# Patient Record
Sex: FEMALE | Race: BLACK OR AFRICAN AMERICAN | URBAN - METROPOLITAN AREA
[De-identification: names, ages, dates, MRNs, and addresses within clinical notes are randomized per-mention and may not be internally consistent; named-entity substitution may affect disease eponyms.]

---

## 2017-12-10 PROBLEM — Z00.00 ENCOUNTER FOR PREVENTIVE HEALTH EXAMINATION: Status: ACTIVE | Noted: 2017-12-10

## 2017-12-22 ENCOUNTER — EMERGENCY (EMERGENCY)
Facility: HOSPITAL | Age: 57
LOS: 0 days | Discharge: HOME | End: 2017-12-22

## 2017-12-22 DIAGNOSIS — B02.9 ZOSTER WITHOUT COMPLICATIONS: ICD-10-CM

## 2017-12-22 DIAGNOSIS — R21 RASH AND OTHER NONSPECIFIC SKIN ERUPTION: ICD-10-CM

## 2017-12-22 DIAGNOSIS — J18.9 PNEUMONIA, UNSPECIFIED ORGANISM: ICD-10-CM

## 2017-12-31 ENCOUNTER — INPATIENT (INPATIENT)
Facility: HOSPITAL | Age: 57
LOS: 3 days | Discharge: HOME | End: 2018-01-04
Attending: INTERNAL MEDICINE

## 2017-12-31 DIAGNOSIS — J18.9 PNEUMONIA, UNSPECIFIED ORGANISM: ICD-10-CM

## 2018-01-08 DIAGNOSIS — A41.9 SEPSIS, UNSPECIFIED ORGANISM: ICD-10-CM

## 2018-01-08 DIAGNOSIS — I10 ESSENTIAL (PRIMARY) HYPERTENSION: ICD-10-CM

## 2018-01-08 DIAGNOSIS — E86.0 DEHYDRATION: ICD-10-CM

## 2018-01-08 DIAGNOSIS — J18.9 PNEUMONIA, UNSPECIFIED ORGANISM: ICD-10-CM

## 2018-01-08 DIAGNOSIS — D61.818 OTHER PANCYTOPENIA: ICD-10-CM

## 2018-01-08 DIAGNOSIS — B02.29 OTHER POSTHERPETIC NERVOUS SYSTEM INVOLVEMENT: ICD-10-CM

## 2018-01-08 DIAGNOSIS — R63.8 OTHER SYMPTOMS AND SIGNS CONCERNING FOOD AND FLUID INTAKE: ICD-10-CM

## 2018-01-08 DIAGNOSIS — D69.6 THROMBOCYTOPENIA, UNSPECIFIED: ICD-10-CM

## 2018-01-08 DIAGNOSIS — E87.1 HYPO-OSMOLALITY AND HYPONATREMIA: ICD-10-CM

## 2018-01-08 DIAGNOSIS — R09.02 HYPOXEMIA: ICD-10-CM

## 2018-01-08 DIAGNOSIS — N17.9 ACUTE KIDNEY FAILURE, UNSPECIFIED: ICD-10-CM

## 2018-01-09 ENCOUNTER — APPOINTMENT (OUTPATIENT)
Dept: OBGYN | Facility: CLINIC | Age: 58
End: 2018-01-09

## 2018-01-13 ENCOUNTER — INPATIENT (INPATIENT)
Facility: HOSPITAL | Age: 58
LOS: 5 days | Discharge: HOME | End: 2018-01-19
Attending: INTERNAL MEDICINE

## 2018-01-13 DIAGNOSIS — J18.9 PNEUMONIA, UNSPECIFIED ORGANISM: ICD-10-CM

## 2018-01-13 DIAGNOSIS — E87.1 HYPO-OSMOLALITY AND HYPONATREMIA: ICD-10-CM

## 2018-01-24 DIAGNOSIS — A41.9 SEPSIS, UNSPECIFIED ORGANISM: ICD-10-CM

## 2018-01-24 DIAGNOSIS — J91.8 PLEURAL EFFUSION IN OTHER CONDITIONS CLASSIFIED ELSEWHERE: ICD-10-CM

## 2018-01-24 DIAGNOSIS — J18.9 PNEUMONIA, UNSPECIFIED ORGANISM: ICD-10-CM

## 2018-01-24 DIAGNOSIS — R50.9 FEVER, UNSPECIFIED: ICD-10-CM

## 2018-01-24 DIAGNOSIS — J10.1 INFLUENZA DUE TO OTHER IDENTIFIED INFLUENZA VIRUS WITH OTHER RESPIRATORY MANIFESTATIONS: ICD-10-CM

## 2018-01-24 DIAGNOSIS — D50.9 IRON DEFICIENCY ANEMIA, UNSPECIFIED: ICD-10-CM

## 2018-05-17 ENCOUNTER — APPOINTMENT (OUTPATIENT)
Dept: OBGYN | Facility: CLINIC | Age: 58
End: 2018-05-17
Payer: MEDICAID

## 2018-05-17 PROCEDURE — 99396 PREV VISIT EST AGE 40-64: CPT

## 2018-12-11 ENCOUNTER — APPOINTMENT (OUTPATIENT)
Dept: OBGYN | Facility: CLINIC | Age: 58
End: 2018-12-11
Payer: MEDICAID

## 2018-12-11 PROCEDURE — 99214 OFFICE O/P EST MOD 30 MIN: CPT

## 2018-12-17 ENCOUNTER — APPOINTMENT (OUTPATIENT)
Dept: OBGYN | Facility: CLINIC | Age: 58
End: 2018-12-17
Payer: MEDICAID

## 2018-12-17 PROCEDURE — 76830 TRANSVAGINAL US NON-OB: CPT

## 2018-12-20 ENCOUNTER — APPOINTMENT (OUTPATIENT)
Dept: OBGYN | Facility: CLINIC | Age: 58
End: 2018-12-20
Payer: MEDICAID

## 2018-12-20 PROCEDURE — 57454 BX/CURETT OF CERVIX W/SCOPE: CPT

## 2019-01-15 ENCOUNTER — APPOINTMENT (OUTPATIENT)
Dept: OBGYN | Facility: CLINIC | Age: 59
End: 2019-01-15
Payer: MEDICAID

## 2019-01-15 PROCEDURE — 58558Z: CUSTOM

## 2019-01-29 ENCOUNTER — APPOINTMENT (OUTPATIENT)
Dept: OBGYN | Facility: CLINIC | Age: 59
End: 2019-01-29

## 2019-04-09 ENCOUNTER — APPOINTMENT (OUTPATIENT)
Dept: OBGYN | Facility: CLINIC | Age: 59
End: 2019-04-09

## 2019-06-07 ENCOUNTER — APPOINTMENT (OUTPATIENT)
Dept: PLASTIC SURGERY | Facility: CLINIC | Age: 59
End: 2019-06-07
Payer: COMMERCIAL

## 2019-06-07 VITALS — BODY MASS INDEX: 28.04 KG/M2 | WEIGHT: 185 LBS | HEIGHT: 68 IN

## 2019-06-07 DIAGNOSIS — Z78.9 OTHER SPECIFIED HEALTH STATUS: ICD-10-CM

## 2019-06-07 PROCEDURE — 99202 OFFICE O/P NEW SF 15 MIN: CPT | Mod: 25

## 2019-06-07 PROCEDURE — 20550 NJX 1 TENDON SHEATH/LIGAMENT: CPT

## 2019-06-07 RX ORDER — NABUMETONE 500 MG/1
500 TABLET, FILM COATED ORAL
Refills: 0 | Status: ACTIVE | COMMUNITY

## 2019-06-07 NOTE — PHYSICAL EXAM
[de-identified] : well-appearining, NAD [de-identified] : right UE: normal tenodesis and finger cascade, guarding of radial wrist and thumb, intact FDS/FDP/extensors, tenderness over CMC, +grind test, + tenderness of 1st extensor compartment +finkelstein test; triggering of thumb with palpable volar nodule at A1 pulley\par Left UE: normal tenodesis and finger cascade, guarding of radial wrist and thumb, intact FDS/FDP/extensors, negative grind test, no palmar nodules

## 2019-06-07 NOTE — HISTORY OF PRESENT ILLNESS
[FreeTextEntry1] : 57 yo RHD F with 1 year history of right wrist pain of thumb.  She reports that the right thumb cannot be extended or flexed due to pain over the back of thumb/wrist and the palmar side of thumb.  She reports 2 month history of thumb spica splint use with some relief in order to work.  But still has persistent right wrist pain, which has been worsening over 1 year.   Pt was started on nabumetone for wrist pain with some relief.  She reports no prior surgical treatments for current complaint or prior UE surgeries.  She was referred by her Rheumatologist- Dr. Colindres for treatment of suspected DeQuervain's syndrome.  \par \par Denies numbness, redness or swelling in hands/fingers.

## 2019-06-07 NOTE — PROCEDURE
[Nl] : None [FreeTextEntry1] : right wrist DeQervain's and trigger thumb [FreeTextEntry6] : The benefits, risks, and outcomes of kenalog injection were discussed. The risks include infection, hypopigmentation, poor wound healing, fat atrophy, and dissatisfaction with outcome. The patient understood the risks and elected to proceed with steroid injection.\par \par Informed consent was obtained\par \par The right 1st extensor compartement and thumb at A1 pulley finger was prepared in sterile fashion. \par The injection site was identified and marked. \par Triamcinolone 1 ml was injected into the extensor tendon sheath without issue. \par Triamcinolone 1 ml was injected into the flexor tendon sheath without issue. \par The patient tolerated the procedure. [FreeTextEntry2] : right wrist DeQervain's and trigger thumb kenalog injection

## 2019-06-13 ENCOUNTER — APPOINTMENT (OUTPATIENT)
Dept: OBGYN | Facility: CLINIC | Age: 59
End: 2019-06-13
Payer: COMMERCIAL

## 2019-06-13 PROCEDURE — 99213 OFFICE O/P EST LOW 20 MIN: CPT

## 2019-06-13 PROCEDURE — 87490 CHLMYD TRACH DNA DIR PROBE: CPT

## 2019-06-23 ENCOUNTER — EMERGENCY (EMERGENCY)
Facility: HOSPITAL | Age: 59
LOS: 0 days | Discharge: HOME | End: 2019-06-24
Attending: EMERGENCY MEDICINE | Admitting: EMERGENCY MEDICINE
Payer: COMMERCIAL

## 2019-06-23 VITALS
WEIGHT: 184.97 LBS | SYSTOLIC BLOOD PRESSURE: 119 MMHG | RESPIRATION RATE: 18 BRPM | TEMPERATURE: 100 F | HEART RATE: 120 BPM | OXYGEN SATURATION: 97 % | DIASTOLIC BLOOD PRESSURE: 58 MMHG

## 2019-06-23 DIAGNOSIS — R53.81 OTHER MALAISE: ICD-10-CM

## 2019-06-23 DIAGNOSIS — R50.9 FEVER, UNSPECIFIED: ICD-10-CM

## 2019-06-23 DIAGNOSIS — Z79.51 LONG TERM (CURRENT) USE OF INHALED STEROIDS: ICD-10-CM

## 2019-06-23 DIAGNOSIS — R05 COUGH: ICD-10-CM

## 2019-06-23 DIAGNOSIS — Z79.52 LONG TERM (CURRENT) USE OF SYSTEMIC STEROIDS: ICD-10-CM

## 2019-06-23 LAB
ALBUMIN SERPL ELPH-MCNC: 3.1 G/DL — LOW (ref 3.5–5.2)
ALP SERPL-CCNC: 44 U/L — SIGNIFICANT CHANGE UP (ref 30–115)
ALT FLD-CCNC: 16 U/L — SIGNIFICANT CHANGE UP (ref 0–41)
ANION GAP SERPL CALC-SCNC: 10 MMOL/L — SIGNIFICANT CHANGE UP (ref 7–14)
AST SERPL-CCNC: 22 U/L — SIGNIFICANT CHANGE UP (ref 0–41)
BILIRUB SERPL-MCNC: 0.2 MG/DL — SIGNIFICANT CHANGE UP (ref 0.2–1.2)
BUN SERPL-MCNC: 20 MG/DL — SIGNIFICANT CHANGE UP (ref 10–20)
CALCIUM SERPL-MCNC: 8.3 MG/DL — LOW (ref 8.5–10.1)
CHLORIDE SERPL-SCNC: 101 MMOL/L — SIGNIFICANT CHANGE UP (ref 98–110)
CO2 SERPL-SCNC: 23 MMOL/L — SIGNIFICANT CHANGE UP (ref 17–32)
CREAT SERPL-MCNC: 1.1 MG/DL — SIGNIFICANT CHANGE UP (ref 0.7–1.5)
GLUCOSE SERPL-MCNC: 100 MG/DL — HIGH (ref 70–99)
HCT VFR BLD CALC: 36.9 % — LOW (ref 37–47)
HGB BLD-MCNC: 11.5 G/DL — LOW (ref 12–16)
LACTATE SERPL-SCNC: 0.8 MMOL/L — SIGNIFICANT CHANGE UP (ref 0.5–2.2)
MAGNESIUM SERPL-MCNC: 2.1 MG/DL — SIGNIFICANT CHANGE UP (ref 1.8–2.4)
MCHC RBC-ENTMCNC: 26.6 PG — LOW (ref 27–31)
MCHC RBC-ENTMCNC: 31.2 G/DL — LOW (ref 32–37)
MCV RBC AUTO: 85.2 FL — SIGNIFICANT CHANGE UP (ref 81–99)
NRBC # BLD: 0 /100 WBCS — SIGNIFICANT CHANGE UP (ref 0–0)
PLATELET # BLD AUTO: 142 K/UL — SIGNIFICANT CHANGE UP (ref 130–400)
POTASSIUM SERPL-MCNC: 4.3 MMOL/L — SIGNIFICANT CHANGE UP (ref 3.5–5)
POTASSIUM SERPL-SCNC: 4.3 MMOL/L — SIGNIFICANT CHANGE UP (ref 3.5–5)
PROT SERPL-MCNC: 8 G/DL — SIGNIFICANT CHANGE UP (ref 6–8)
RBC # BLD: 4.33 M/UL — SIGNIFICANT CHANGE UP (ref 4.2–5.4)
RBC # FLD: 14.1 % — SIGNIFICANT CHANGE UP (ref 11.5–14.5)
SODIUM SERPL-SCNC: 134 MMOL/L — LOW (ref 135–146)
WBC # BLD: 3.81 K/UL — LOW (ref 4.8–10.8)
WBC # FLD AUTO: 3.81 K/UL — LOW (ref 4.8–10.8)

## 2019-06-23 PROCEDURE — 99284 EMERGENCY DEPT VISIT MOD MDM: CPT

## 2019-06-23 PROCEDURE — 71046 X-RAY EXAM CHEST 2 VIEWS: CPT | Mod: 26

## 2019-06-23 RX ORDER — SODIUM CHLORIDE 9 MG/ML
2000 INJECTION, SOLUTION INTRAVENOUS ONCE
Refills: 0 | Status: COMPLETED | OUTPATIENT
Start: 2019-06-23 | End: 2019-06-23

## 2019-06-23 RX ORDER — ACETAMINOPHEN 500 MG
650 TABLET ORAL ONCE
Refills: 0 | Status: COMPLETED | OUTPATIENT
Start: 2019-06-23 | End: 2019-06-23

## 2019-06-23 RX ORDER — ALBUTEROL 90 UG/1
2.5 AEROSOL, METERED ORAL ONCE
Refills: 0 | Status: COMPLETED | OUTPATIENT
Start: 2019-06-23 | End: 2019-06-23

## 2019-06-23 RX ADMIN — Medication 650 MILLIGRAM(S): at 23:11

## 2019-06-23 RX ADMIN — SODIUM CHLORIDE 2000 MILLILITER(S): 9 INJECTION, SOLUTION INTRAVENOUS at 23:11

## 2019-06-23 NOTE — ED ADULT NURSE NOTE - CHPI ED NUR SYMPTOMS NEG
no chills/no tingling/no pain/no weakness/no vomiting/no fever/no nausea/no dizziness/no decreased eating/drinking

## 2019-06-23 NOTE — ED ADULT NURSE NOTE - OBJECTIVE STATEMENT
patient complaints of productive cough and congestion., Patient denies n/v, chest pain and no SOB noted.

## 2019-06-24 VITALS
HEART RATE: 88 BPM | OXYGEN SATURATION: 98 % | RESPIRATION RATE: 18 BRPM | SYSTOLIC BLOOD PRESSURE: 110 MMHG | DIASTOLIC BLOOD PRESSURE: 70 MMHG | TEMPERATURE: 98 F

## 2019-06-24 RX ORDER — ALBUTEROL 90 UG/1
2 AEROSOL, METERED ORAL
Qty: 1 | Refills: 0
Start: 2019-06-24

## 2019-06-24 RX ADMIN — ALBUTEROL 2.5 MILLIGRAM(S): 90 AEROSOL, METERED ORAL at 00:05

## 2019-06-24 NOTE — ED PROVIDER NOTE - NSFOLLOWUPINSTRUCTIONS_ED_ALL_ED_FT
Return to the ER for worsening or concerning symptoms.    See printed discharge information sheets for further instructions on care for your condition.     Cough, Adult  Coughing is a reflex that clears your throat and your airways. Coughing helps to heal and protect your lungs. It is normal to cough occasionally, but a cough that happens with other symptoms or lasts a long time may be a sign of a condition that needs treatment. A cough may last only 2–3 weeks (acute), or it may last longer than 8 weeks (chronic).    What are the causes?  Coughing is commonly caused by:    Breathing in substances that irritate your lungs.  A viral or bacterial respiratory infection.  Allergies.  Asthma.  Postnasal drip.  Smoking.  Acid backing up from the stomach into the esophagus (gastroesophageal reflux).  Certain medicines.  Chronic lung problems, including COPD (or rarely, lung cancer).  Other medical conditions such as heart failure.    Follow these instructions at home:  Pay attention to any changes in your symptoms. Take these actions to help with your discomfort:    Take medicines only as told by your health care provider.    If you were prescribed an antibiotic medicine, take it as told by your health care provider. Do not stop taking the antibiotic even if you start to feel better.  Talk with your health care provider before you take a cough suppressant medicine.    Drink enough fluid to keep your urine clear or pale yellow.  If the air is dry, use a cold steam vaporizer or humidifier in your bedroom or your home to help loosen secretions.  Avoid anything that causes you to cough at work or at home.  If your cough is worse at night, try sleeping in a semi-upright position.  Avoid cigarette smoke. If you smoke, quit smoking. If you need help quitting, ask your health care provider.  Avoid caffeine.  Avoid alcohol.  Rest as needed.    Contact a health care provider if:  You have new symptoms.  You cough up pus.  Your cough does not get better after 2–3 weeks, or your cough gets worse.  You cannot control your cough with suppressant medicines and you are losing sleep.  You develop pain that is getting worse or pain that is not controlled with pain medicines.  You have a fever.  You have unexplained weight loss.  You have night sweats.  Get help right away if:  You cough up blood.  You have difficulty breathing.  Your heartbeat is very fast.  This information is not intended to replace advice given to you by your health care provider. Make sure you discuss any questions you have with your health care provider.    Viral Illness, Adult  Viruses are tiny germs that can get into a person's body and cause illness. There are many different types of viruses, and they cause many types of illness. Viral illnesses can range from mild to severe. They can affect various parts of the body.    Common illnesses that are caused by a virus include colds and the flu. Viral illnesses also include serious conditions such as HIV/AIDS (human immunodeficiency virus/acquired immunodeficiency syndrome). A few viruses have been linked to certain cancers.    What are the causes?  Many types of viruses can cause illness. Viruses invade cells in your body, multiply, and cause the infected cells to malfunction or die. When the cell dies, it releases more of the virus. When this happens, you develop symptoms of the illness, and the virus continues to spread to other cells. If the virus takes over the function of the cell, it can cause the cell to divide and grow out of control, as is the case when a virus causes cancer.    Different viruses get into the body in different ways. You can get a virus by:    Swallowing food or water that is contaminated with the virus.  Breathing in droplets that have been coughed or sneezed into the air by an infected person.  Touching a surface that has been contaminated with the virus and then touching your eyes, nose, or mouth.  Being bitten by an insect or animal that carries the virus.  Having sexual contact with a person who is infected with the virus.  Being exposed to blood or fluids that contain the virus, either through an open cut or during a transfusion.    If a virus enters your body, your body's defense system (immune system) will try to fight the virus. You may be at higher risk for a viral illness if your immune system is weak.    What are the signs or symptoms?  Symptoms vary depending on the type of virus and the location of the cells that it invades. Common symptoms of the main types of viral illnesses include:    Cold and flu viruses     Fever.  Headache.  Sore throat.  Muscle aches.  Nasal congestion.  Cough.  Digestive system (gastrointestinal) viruses     Fever.  Abdominal pain.  Nausea.  Diarrhea.  Liver viruses (hepatitis)     Loss of appetite.  Tiredness.  Yellowing of the skin (jaundice).  Brain and spinal cord viruses     Fever.  Headache.  Stiff neck.  Nausea and vomiting.  Confusion or sleepiness.  Skin viruses     Warts.  Itching.  Rash.  Sexually transmitted viruses     Discharge.  Swelling.  Redness.  Rash.  How is this treated?  Viruses can be difficult to treat because they live within cells. Antibiotic medicines do not treat viruses because these drugs do not get inside cells. Treatment for a viral illness may include:    Resting and drinking plenty of fluids.  Medicines to relieve symptoms. These can include over-the-counter medicine for pain and fever, medicines for cough or congestion, and medicines to relieve diarrhea.  Antiviral medicines. These drugs are available only for certain types of viruses. They may help reduce flu symptoms if taken early. There are also many antiviral medicines for hepatitis and HIV/AIDS.    Some viral illnesses can be prevented with vaccinations. A common example is the flu shot.    Follow these instructions at home:  Medicines     Image   Take over-the-counter and prescription medicines only as told by your health care provider.  If you were prescribed an antiviral medicine, take it as told by your health care provider. Do not stop taking the medicine even if you start to feel better.  Be aware of when antibiotics are needed and when they are not needed. Antibiotics do not treat viruses. If your health care provider thinks that you may have a bacterial infection as well as a viral infection, you may get an antibiotic.    Do not ask for an antibiotic prescription if you have been diagnosed with a viral illness. That will not make your illness go away faster.  Frequently taking antibiotics when they are not needed can lead to antibiotic resistance. When this develops, the medicine no longer works against the bacteria that it normally fights.    General instructions     Drink enough fluids to keep your urine clear or pale yellow.  Rest as much as possible.  Return to your normal activities as told by your health care provider. Ask your health care provider what activities are safe for you.  Keep all follow-up visits as told by your health care provider. This is important.  How is this prevented?  ImageTake these actions to reduce your risk of viral infection:    Eat a healthy diet and get enough rest.  Wash your hands often with soap and water. This is especially important when you are in public places. If soap and water are not available, use hand .  Avoid close contact with friends and family who have a viral illness.  If you travel to areas where viral gastrointestinal infection is common, avoid drinking water or eating raw food.  Keep your immunizations up to date. Get a flu shot every year as told by your health care provider.  Do not share toothbrushes, nail clippers, razors, or needles with other people.  Always practice safe sex.    Contact a health care provider if:  You have symptoms of a viral illness that do not go away.  Your symptoms come back after going away.  Your symptoms get worse.  Get help right away if:  You have trouble breathing.  You have a severe headache or a stiff neck.  You have severe vomiting or abdominal pain.  This information is not intended to replace advice given to you by your health care provider. Make sure you discuss any questions you have with your health care provider.

## 2019-06-24 NOTE — ED PROVIDER NOTE - CLINICAL SUMMARY MEDICAL DECISION MAKING FREE TEXT BOX
58f w fever at home and cough, no resp distress. Labs & imaging reviewed. Nebs given with improvement in symptoms. Pt advised regarding symptomatic/supportive care, importance of PMD f/u, and symptoms to prompt ED return. Copy of results given to patient.

## 2019-06-24 NOTE — ED PROVIDER NOTE - PHYSICAL EXAMINATION
Physical Exam  General: Awake, alert, NAD, WDWN, non-toxic appearing, NCAT  Eyes: PERRL, EOMI, no icterus, lids and conjunctivae are normal  ENT: External inspection normal, pink/dry membranes, pharynx normal, no pharyngeal erythema/exudate  CV: S1S2, regular rate and rhythm, no murmur/gallops/rubs, no JVD, 2+ pulses b/l, no edema/cords/homans, warm/well-perfused  Respiratory: Normal respiratory rate/effort, no respiratory distress, normal voice, speaking full sentences, lungs clear to auscultation b/l, no wheezing/rales/rhonchi, no retractions, no stridor  Abdomen: Soft abdomen, no tender/distended/guarding/rebound, no CVA tender  Musculoskeletal: FROM all 4 extremities, N/V intact  Neck: FROM neck, supple, no meningismus, trachea midline, no JVD  Integumentary: Color normal for race, warm and dry, no rash  Neuro: Oriented x3, CN 2-12 grossly intact, normal motor, normal sensory  Psych: Oriented x3, mood normal, affect normal

## 2019-06-24 NOTE — ED PROVIDER NOTE - NSFOLLOWUPCLINICS_GEN_ALL_ED_FT
A Family Medicine Doctor  Family Medicine  .  [place]   Phone:   Fax:   Follow Up Time: 1-3 Days    [address]   Phone: [phone]  Fax:   Follow Up Time: 1-3 Days

## 2019-06-24 NOTE — ED PROVIDER NOTE - OBJECTIVE STATEMENT
58f w no PMH presents reporting fever Tmax 100.8 and cough w dark sputum since Thursday. Symptoms are moderate, constant, no exacerbating/alleviating. No recent travel/hosp/abx/sick contacts.

## 2019-06-24 NOTE — ED PROVIDER NOTE - CARE PLAN
Principal Discharge DX:	Cough  Assessment and plan of treatment:	58f w fever at home and cough, no resp distress. --Labs, CXR r/o PNA, nebs as needed, observe/re-assess.

## 2019-06-24 NOTE — ED PROVIDER NOTE - NS ED ROS FT
Review of Systems  Constitutional:  +Fever + malaise  Eyes:  Negative.   ENMT:  No nasal congestion, discharge, or throat pain.   Cardiac:  No chest pain, syncope, or edema.  Respiratory:  No dyspnea, wheezing, or hemoptysis. +Cough w dark sputum  GI:  No vomiting, diarrhea, or abdominal pain. No melena or hematochezia.  :  No dysuria or hematuria.   Musculoskeletal:  No joint swelling, joint pain, or back pain.  Skin:  No skin rash, jaundice, or lesions.  Neuro:  No headache, loss of sensation, or focal weakness.  No change in mental status.

## 2019-06-24 NOTE — ED PROVIDER NOTE - PLAN OF CARE
58f w fever at home and cough, no resp distress. --Labs, CXR r/o PNA, nebs as needed, observe/re-assess.

## 2019-07-19 ENCOUNTER — APPOINTMENT (OUTPATIENT)
Dept: PLASTIC SURGERY | Facility: CLINIC | Age: 59
End: 2019-07-19
Payer: COMMERCIAL

## 2019-07-19 PROBLEM — Z78.9 OTHER SPECIFIED HEALTH STATUS: Chronic | Status: ACTIVE | Noted: 2019-06-24

## 2019-07-19 PROCEDURE — 99212 OFFICE O/P EST SF 10 MIN: CPT | Mod: 25

## 2019-07-19 PROCEDURE — 20550 NJX 1 TENDON SHEATH/LIGAMENT: CPT | Mod: F5

## 2019-07-19 NOTE — PROCEDURE
[Nl] : None [FreeTextEntry1] : right trigger thumb [FreeTextEntry2] : right trigger thumb kenalog injection [FreeTextEntry6] : The benefits, risks, and outcomes of kenalog injection were discussed. The risks include infection, hypopigmentation, poor wound healing, fat atrophy, and dissatisfaction with outcome. The patient understood the risks and elected to proceed with steroid injection.\par \par Informed consent was obtained\par \par Triamcinolone 1 ml was injected into the flexor tendon sheath without issue. \par The patient tolerated the procedure.

## 2019-07-19 NOTE — HISTORY OF PRESENT ILLNESS
[FreeTextEntry1] : 57 yo RHD F with 1 year history of right wrist pain of thumb.  She reports that the right thumb cannot be extended or flexed due to pain over the back of thumb/wrist and the palmar side of thumb.  She reports 2 month history of thumb spica splint use with some relief in order to work.  But still has persistent right wrist pain, which has been worsening over 1 year.   Pt was started on nabumetone for wrist pain with some relief.  She reports no prior surgical treatments for current complaint or prior UE surgeries.  She was referred by her Rheumatologist- Dr. Colindres for treatment of suspected DeQuervain's syndrome.  \par \par Denies numbness, redness or swelling in hands/fingers.\par \par Interval hx (7/19/19):  Here for follow up after kenalog injection to right 1st extensor and trigger thumb.  Compliant with right thumb spica splint.  Reports improvement of pain in right wrist and thumb.   thumb still with difficulty flexing.  New c/o right radial wrist decreased sensation for the last 2 weeks.  Denies hand/wrist trauma.

## 2019-07-19 NOTE — ASSESSMENT
[FreeTextEntry1] : 59 yo F with with right wrist DeQuervain's tendonitis, resolving s/p kenalog (1); and persistent symptomatic right trigger thumb\par s/p kenalog injection x 2 (right trigger thumb)\par \par -Recommend repeat kenalog injection right trigger thumb\par -Benefits, risks and outcomes were reviewed.  Patient opted for conservative with trial of kenalog injection\par -Pt tolerated the procedure\par -continue right thumb spica for comfort\par -follow up in 6 weeks

## 2019-07-19 NOTE — PHYSICAL EXAM
[de-identified] : well-appearing, NAD [de-identified] : right UE: normal tenodesis and finger cascade, no guarding of radial wrist and thumb, intact FDS/FDP/extensors, tenderness over CMC, +grind test, +nontender 1st extensor compartment negative finkelstein test; infrequent triggering of thumb with nontender palpable volar nodule at A1 pulley\par

## 2019-09-06 ENCOUNTER — APPOINTMENT (OUTPATIENT)
Dept: PLASTIC SURGERY | Facility: CLINIC | Age: 59
End: 2019-09-06
Payer: COMMERCIAL

## 2019-09-06 DIAGNOSIS — M65.4 RADIAL STYLOID TENOSYNOVITIS [DE QUERVAIN]: ICD-10-CM

## 2019-09-06 DIAGNOSIS — M25.631 STIFFNESS OF RIGHT WRIST, NOT ELSEWHERE CLASSIFIED: ICD-10-CM

## 2019-09-06 DIAGNOSIS — M65.311 TRIGGER THUMB, RIGHT THUMB: ICD-10-CM

## 2019-09-06 PROCEDURE — 99213 OFFICE O/P EST LOW 20 MIN: CPT

## 2019-09-06 NOTE — HISTORY OF PRESENT ILLNESS
[FreeTextEntry1] : 59 yo RHD F with 1 year history of right wrist pain of thumb.  She reports that the right thumb cannot be extended or flexed due to pain over the back of thumb/wrist and the palmar side of thumb.  She reports 2 month history of thumb spica splint use with some relief in order to work.  But still has persistent right wrist pain, which has been worsening over 1 year.   Pt was started on nabumetone for wrist pain with some relief.  She reports no prior surgical treatments for current complaint or prior UE surgeries.  She was referred by her Rheumatologist- Dr. Colindres for treatment of suspected DeQuervain's syndrome.  \par \par Denies numbness, redness or swelling in hands/fingers.\par \par Interval hx (7/19/19):  Here for follow up after kenalog injection to right 1st extensor and trigger thumb.  Compliant with right thumb spica splint.  Reports improvement of pain in right wrist and thumb.   thumb still with difficulty flexing.  New c/o right radial wrist decreased sensation for the last 2 weeks.  Denies hand/wrist trauma.\par \par Interval hx (9/6/19): Pt presents for f/u after repeat kenalog injection to right trigger thumb.  patient feels much better since last kenalog injection to right trigger thumb.  She reports thumb spica splint use.  She feels right wrist stiffness.  Denies clicking, popping, wrist pain

## 2019-09-06 NOTE — PHYSICAL EXAM
[de-identified] : well-appearing, NAD [de-identified] : right UE: normal tenodesis and finger cascade, no guarding of radial wrist and thumb, intact FDS/FDP/extensors, tenderness over CMC, negative grind test, nontender 1st extensor compartment, negative finkelstein test; no triggering of thumb with nontender palpable volar nodule at A1 pulley\par right wrist with decreased extensor ROM compared to left

## 2019-09-06 NOTE — ASSESSMENT
[FreeTextEntry1] : 57 yo F with with right wrist DeQuervain's tendonitis, resolved s/p kenalog (1); and asymptomatic right trigger thumb\par s/p kenalog injection x 2 (right trigger thumb)\par Right wrist stiffness\par \par -No indication for kenalog injection or surgical intervention at this time\par -Reassurance given; if right trigger thumb becomes symptoamtic in the future recommend TFR\par -OHT referral given for right wrist stiffness and decreased ROM\par -All questions were answered\par -Follow up 1 month after OHT

## 2019-12-06 ENCOUNTER — APPOINTMENT (OUTPATIENT)
Dept: OTOLARYNGOLOGY | Facility: CLINIC | Age: 59
End: 2019-12-06
Payer: MEDICAID

## 2019-12-06 VITALS
SYSTOLIC BLOOD PRESSURE: 146 MMHG | BODY MASS INDEX: 28.79 KG/M2 | OXYGEN SATURATION: 90 % | WEIGHT: 190 LBS | HEIGHT: 68 IN | DIASTOLIC BLOOD PRESSURE: 84 MMHG | HEART RATE: 84 BPM

## 2019-12-06 PROCEDURE — 99203 OFFICE O/P NEW LOW 30 MIN: CPT | Mod: 25

## 2019-12-06 PROCEDURE — 31231 NASAL ENDOSCOPY DX: CPT

## 2019-12-06 RX ORDER — LEVOCETIRIZINE DIHYDROCHLORIDE 5 MG/1
5 TABLET ORAL DAILY
Qty: 30 | Refills: 2 | Status: ACTIVE | COMMUNITY
Start: 2019-12-06 | End: 1900-01-01

## 2019-12-06 RX ORDER — FLUTICASONE PROPIONATE 50 UG/1
50 SPRAY, METERED NASAL
Qty: 15.8 | Refills: 6 | Status: ACTIVE | COMMUNITY
Start: 2019-12-06 | End: 1900-01-01

## 2019-12-06 RX ORDER — METHYLPREDNISOLONE 4 MG/1
4 TABLET ORAL
Qty: 1 | Refills: 0 | Status: ACTIVE | COMMUNITY
Start: 2019-12-06 | End: 1900-01-01

## 2019-12-06 NOTE — PROCEDURE
[Recalcitrant Symptoms] : recalcitrant symptoms  [Topical Lidocaine] : topical lidocaine [Anterior rhinoscopy insufficient to account for symptoms] : anterior rhinoscopy insufficient to account for symptoms [Flexible Endoscope] : examined with the flexible endoscope [Oxymetazoline HCl] : oxymetazoline HCl [Congested] : congested [Normal] : the paranasal sinuses had no abnormalities [FreeTextEntry6] : The following anatomic sites were directly examined in a sequential fashion:\par The scope was introduced in the nasal passage between the middle and inferior turbinates to exam the inferior portion of the middle meatus and the fontanelle, as well as the maxillary ostia. Next, the scope was passed medically and posteriorly to the middle turbinates to examine the sphenoethmoid recess and the superior turbinate region.\par

## 2019-12-06 NOTE — REASON FOR VISIT
[Initial Evaluation] : an initial evaluation for [FreeTextEntry2] : Hearing issues and Loss of taste.

## 2019-12-06 NOTE — PHYSICAL EXAM
[de-identified] : hypertrophy [Midline] : trachea located in midline position [Normal] : no rashes

## 2019-12-17 ENCOUNTER — APPOINTMENT (OUTPATIENT)
Dept: OBGYN | Facility: CLINIC | Age: 59
End: 2019-12-17
Payer: COMMERCIAL

## 2019-12-17 PROCEDURE — 99213 OFFICE O/P EST LOW 20 MIN: CPT

## 2019-12-17 PROCEDURE — 87490 CHLMYD TRACH DNA DIR PROBE: CPT

## 2020-01-03 ENCOUNTER — APPOINTMENT (OUTPATIENT)
Dept: OTOLARYNGOLOGY | Facility: CLINIC | Age: 60
End: 2020-01-03

## 2020-01-17 ENCOUNTER — EMERGENCY (EMERGENCY)
Facility: HOSPITAL | Age: 60
LOS: 0 days | Discharge: HOME | End: 2020-01-17
Admitting: EMERGENCY MEDICINE
Payer: COMMERCIAL

## 2020-01-17 VITALS
SYSTOLIC BLOOD PRESSURE: 154 MMHG | OXYGEN SATURATION: 95 % | RESPIRATION RATE: 16 BRPM | HEART RATE: 107 BPM | DIASTOLIC BLOOD PRESSURE: 80 MMHG | TEMPERATURE: 99 F

## 2020-01-17 DIAGNOSIS — J39.2 OTHER DISEASES OF PHARYNX: ICD-10-CM

## 2020-01-17 DIAGNOSIS — R50.9 FEVER, UNSPECIFIED: ICD-10-CM

## 2020-01-17 DIAGNOSIS — R05 COUGH: ICD-10-CM

## 2020-01-17 DIAGNOSIS — J34.89 OTHER SPECIFIED DISORDERS OF NOSE AND NASAL SINUSES: ICD-10-CM

## 2020-01-17 DIAGNOSIS — R09.89 OTHER SPECIFIED SYMPTOMS AND SIGNS INVOLVING THE CIRCULATORY AND RESPIRATORY SYSTEMS: ICD-10-CM

## 2020-01-17 PROCEDURE — 99283 EMERGENCY DEPT VISIT LOW MDM: CPT

## 2020-01-17 PROCEDURE — 71046 X-RAY EXAM CHEST 2 VIEWS: CPT | Mod: 26

## 2020-01-17 RX ORDER — AZITHROMYCIN 500 MG/1
1 TABLET, FILM COATED ORAL
Qty: 6 | Refills: 0
Start: 2020-01-17 | End: 2020-01-21

## 2020-01-17 RX ORDER — ACETAMINOPHEN 500 MG
650 TABLET ORAL ONCE
Refills: 0 | Status: COMPLETED | OUTPATIENT
Start: 2020-01-17 | End: 2020-01-17

## 2020-01-17 RX ADMIN — Medication 650 MILLIGRAM(S): at 15:34

## 2020-01-17 NOTE — ED PROVIDER NOTE - NS ED ROS FT
Constitutional: + subjective fever, chills. no recent weight loss, change in appetite or malaise  Eyes: no redness/discharge/pain/vision changes  ENT: + rhinorrhea. No ear pain  Cardiac: No chest pain, SOB or edema.  Respiratory: + productive cough. No respiratory distress  GI: No nausea, vomiting, diarrhea or abdominal pain.  : No dysuria, frequency, urgency or hematuria  MS: no pain to back or extremities, no loss of ROM, no weakness  Neuro: No headache or weakness. No LOC.  Skin: No skin rash.  Except as documented in the HPI, all other systems are negative.

## 2020-01-17 NOTE — ED PROVIDER NOTE - OBJECTIVE STATEMENT
59 year old F no pmhx c/o productive cough with green sputum x 2 days, runny nose, subjective fever, chills. No neck stiffness/pain, sick contacts, recent travel, chest pain, sob, nausea, vomiting, diarrhea, urinary symptoms. No smoking hx.

## 2020-01-17 NOTE — ED ADULT NURSE NOTE - NSIMPLEMENTINTERV_GEN_ALL_ED
Implemented All Universal Safety Interventions:  [place] to call system. Call bell, personal items and telephone within reach. Instruct patient to call for assistance. Room bathroom lighting operational. Non-slip footwear when patient is off stretcher. Physically safe environment: no spills, clutter or unnecessary equipment. Stretcher in lowest position, wheels locked, appropriate side rails in place.

## 2020-01-17 NOTE — ED ADULT TRIAGE NOTE - CHIEF COMPLAINT QUOTE
" I started coughing Wednesday and was dry yesterday when I cough head is pounding I feel if I have fever, muscle pain"

## 2020-01-17 NOTE — ED PROVIDER NOTE - PHYSICAL EXAMINATION
CONSTITUTIONAL: Well-appearing; well-nourished; in no apparent distress.   EYES: PERRL; EOM intact.   ENT: normal nose; no rhinorrhea; Mildly erythematous pharynx. No exudates. Uvula midline no tonsillar hypertrophy.   NECK: Supple; non-tender; no cervical lymphadenopathy. No JVD.   CARDIOVASCULAR: Normal S1, S2; no murmurs, rubs, or gallops.   RESPIRATORY: Normal chest excursion with respiration; breath sounds clear and equal bilaterally; no wheezes, rhonchi, or rales.  GI/: Normal bowel sounds; non-distended; non-tender; no palpable organomegaly.   MS: No evidence of trauma or deformity. Normal ROM in all four extremities; non-tender to palpation; distal pulses are normal.   SKIN: Normal for age and race; warm; dry; good turgor; no apparent lesions or exudate.   NEURO/PSYCH: A & O x 4; grossly unremarkable. mood and manner are appropriate. Grooming and personal hygiene are appropriate. No apparent thoughts of harm to self or others.

## 2020-01-17 NOTE — ED PROVIDER NOTE - NSFOLLOWUPINSTRUCTIONS_ED_ALL_ED_FT

## 2020-01-17 NOTE — ED PROVIDER NOTE - PATIENT PORTAL LINK FT
You can access the FollowMyHealth Patient Portal offered by [facility] by registering at the following website: [email or web address]. By joining [facility]’s FollowMyHealth portal, you will also be able to view your health information using other applications (apps) compatible with our system.

## 2020-01-23 ENCOUNTER — APPOINTMENT (OUTPATIENT)
Dept: OTOLARYNGOLOGY | Facility: CLINIC | Age: 60
End: 2020-01-23
Payer: MEDICAID

## 2020-01-23 DIAGNOSIS — R43.2 PARAGEUSIA: ICD-10-CM

## 2020-01-23 DIAGNOSIS — J31.0 CHRONIC RHINITIS: ICD-10-CM

## 2020-01-23 DIAGNOSIS — R43.0 ANOSMIA: ICD-10-CM

## 2020-01-23 DIAGNOSIS — R05 COUGH: ICD-10-CM

## 2020-01-23 PROCEDURE — 31575 DIAGNOSTIC LARYNGOSCOPY: CPT

## 2020-01-23 PROCEDURE — 31231 NASAL ENDOSCOPY DX: CPT | Mod: 59

## 2020-01-23 PROCEDURE — 99213 OFFICE O/P EST LOW 20 MIN: CPT | Mod: 25

## 2020-01-23 RX ORDER — FAMOTIDINE 40 MG/1
40 TABLET, FILM COATED ORAL
Qty: 30 | Refills: 0 | Status: ACTIVE | COMMUNITY
Start: 2020-01-23 | End: 1900-01-01

## 2020-01-23 RX ORDER — FLUTICASONE PROPIONATE 50 UG/1
50 SPRAY, METERED NASAL DAILY
Qty: 1 | Refills: 0 | Status: ACTIVE | COMMUNITY
Start: 2020-01-23 | End: 1900-01-01

## 2020-01-23 RX ORDER — PANTOPRAZOLE 40 MG/1
40 TABLET, DELAYED RELEASE ORAL TWICE DAILY
Qty: 30 | Refills: 3 | Status: ACTIVE | COMMUNITY
Start: 2020-01-23 | End: 1900-01-01

## 2020-01-23 NOTE — PROCEDURE
[Recalcitrant Symptoms] : recalcitrant symptoms  [Oxymetazoline HCl] : oxymetazoline HCl [Topical Lidocaine] : topical lidocaine [Congested] : congested [Flexible Endoscope] : examined with the flexible endoscope [Glottis Arytenoid Cartilages Erythema] : bilateral arytenoid ~M erythema [Arytenoid Erythema ___ /4] : arytenoid erythema [unfilled]U/4 [Normal] : posterior cricoid area had healthy pink mucosa in the interarytenoid area and the esophageal inlet [Interarytenoid Edema] : interarytenoid area edematous

## 2020-01-23 NOTE — REASON FOR VISIT
[Subsequent Evaluation] : a subsequent evaluation for [FreeTextEntry2] : loss of smell and taste, rhinitis

## 2020-01-23 NOTE — PHYSICAL EXAM
[Midline] : trachea located in midline position [Normal] : no rashes [de-identified] : hypertrophy

## 2020-01-23 NOTE — HISTORY OF PRESENT ILLNESS
[FreeTextEntry1] : Pt returns to the office as a 1 month follow up on loss of smell and taste and rhinitis

## 2020-02-11 NOTE — ED ADULT NURSE NOTE - FINAL NURSING ELECTRONIC SIGNATURE
24-Jun-2019 01:15 No Repair - Repaired With Adjacent Surgical Defect Text (Leave Blank If You Do Not Want): After obtaining clear surgical margins the defect was repaired concurrently with another surgical defect which was in close approximation.

## 2020-02-27 ENCOUNTER — APPOINTMENT (OUTPATIENT)
Dept: OTOLARYNGOLOGY | Facility: CLINIC | Age: 60
End: 2020-02-27

## 2020-06-22 ENCOUNTER — APPOINTMENT (OUTPATIENT)
Dept: OBGYN | Facility: CLINIC | Age: 60
End: 2020-06-22
Payer: COMMERCIAL

## 2020-06-22 PROCEDURE — 99396 PREV VISIT EST AGE 40-64: CPT

## 2020-06-29 ENCOUNTER — APPOINTMENT (OUTPATIENT)
Dept: OBGYN | Facility: CLINIC | Age: 60
End: 2020-06-29
Payer: COMMERCIAL

## 2020-06-29 PROCEDURE — 76830 TRANSVAGINAL US NON-OB: CPT

## 2020-12-04 ENCOUNTER — OUTPATIENT (OUTPATIENT)
Dept: OUTPATIENT SERVICES | Facility: HOSPITAL | Age: 60
LOS: 1 days | Discharge: HOME | End: 2020-12-04
Payer: COMMERCIAL

## 2020-12-04 DIAGNOSIS — Z12.2 ENCOUNTER FOR SCREENING FOR MALIGNANT NEOPLASM OF RESPIRATORY ORGANS: ICD-10-CM

## 2020-12-04 PROCEDURE — 71250 CT THORAX DX C-: CPT | Mod: 26

## 2020-12-28 ENCOUNTER — APPOINTMENT (OUTPATIENT)
Dept: OBGYN | Facility: CLINIC | Age: 60
End: 2020-12-28
Payer: COMMERCIAL

## 2020-12-28 PROCEDURE — 99072 ADDL SUPL MATRL&STAF TM PHE: CPT

## 2020-12-28 PROCEDURE — 99213 OFFICE O/P EST LOW 20 MIN: CPT

## 2020-12-28 PROCEDURE — 76830 TRANSVAGINAL US NON-OB: CPT

## 2021-06-14 ENCOUNTER — APPOINTMENT (OUTPATIENT)
Dept: OBGYN | Facility: CLINIC | Age: 61
End: 2021-06-14
Payer: COMMERCIAL

## 2021-06-14 PROCEDURE — 99072 ADDL SUPL MATRL&STAF TM PHE: CPT

## 2021-06-14 PROCEDURE — 99213 OFFICE O/P EST LOW 20 MIN: CPT

## 2021-06-29 ENCOUNTER — NON-APPOINTMENT (OUTPATIENT)
Age: 61
End: 2021-06-29

## 2021-06-29 ENCOUNTER — APPOINTMENT (OUTPATIENT)
Dept: OBGYN | Facility: CLINIC | Age: 61
End: 2021-06-29
Payer: COMMERCIAL

## 2021-06-29 PROCEDURE — 99072 ADDL SUPL MATRL&STAF TM PHE: CPT

## 2021-06-29 PROCEDURE — 57454 BX/CURETT OF CERVIX W/SCOPE: CPT

## 2021-07-20 ENCOUNTER — APPOINTMENT (OUTPATIENT)
Dept: OBGYN | Facility: CLINIC | Age: 61
End: 2021-07-20
Payer: COMMERCIAL

## 2021-07-20 PROCEDURE — 99212 OFFICE O/P EST SF 10 MIN: CPT

## 2021-07-20 PROCEDURE — 99072 ADDL SUPL MATRL&STAF TM PHE: CPT

## 2021-12-02 ENCOUNTER — INPATIENT (INPATIENT)
Facility: HOSPITAL | Age: 61
LOS: 0 days | Discharge: HOME | End: 2021-12-02
Attending: INTERNAL MEDICINE | Admitting: INTERNAL MEDICINE
Payer: COMMERCIAL

## 2021-12-02 ENCOUNTER — TRANSCRIPTION ENCOUNTER (OUTPATIENT)
Age: 61
End: 2021-12-02

## 2021-12-02 VITALS
WEIGHT: 190.04 LBS | HEIGHT: 68 IN | RESPIRATION RATE: 24 BRPM | HEART RATE: 100 BPM | DIASTOLIC BLOOD PRESSURE: 83 MMHG | SYSTOLIC BLOOD PRESSURE: 145 MMHG | OXYGEN SATURATION: 99 % | TEMPERATURE: 99 F

## 2021-12-02 VITALS — SYSTOLIC BLOOD PRESSURE: 139 MMHG | HEART RATE: 99 BPM | DIASTOLIC BLOOD PRESSURE: 78 MMHG | TEMPERATURE: 97 F

## 2021-12-02 LAB
ALBUMIN SERPL ELPH-MCNC: 3.7 G/DL — SIGNIFICANT CHANGE UP (ref 3.5–5.2)
ALP SERPL-CCNC: 45 U/L — SIGNIFICANT CHANGE UP (ref 30–115)
ALT FLD-CCNC: 17 U/L — SIGNIFICANT CHANGE UP (ref 0–41)
ANION GAP SERPL CALC-SCNC: 15 MMOL/L — HIGH (ref 7–14)
AST SERPL-CCNC: 26 U/L — SIGNIFICANT CHANGE UP (ref 0–41)
BASOPHILS # BLD AUTO: 0 K/UL — SIGNIFICANT CHANGE UP (ref 0–0.2)
BASOPHILS NFR BLD AUTO: 0 % — SIGNIFICANT CHANGE UP (ref 0–1)
BILIRUB SERPL-MCNC: 0.3 MG/DL — SIGNIFICANT CHANGE UP (ref 0.2–1.2)
BUN SERPL-MCNC: 12 MG/DL — SIGNIFICANT CHANGE UP (ref 10–20)
CALCIUM SERPL-MCNC: 8.7 MG/DL — SIGNIFICANT CHANGE UP (ref 8.5–10.1)
CHLORIDE SERPL-SCNC: 103 MMOL/L — SIGNIFICANT CHANGE UP (ref 98–110)
CO2 SERPL-SCNC: 19 MMOL/L — SIGNIFICANT CHANGE UP (ref 17–32)
CREAT SERPL-MCNC: 0.7 MG/DL — SIGNIFICANT CHANGE UP (ref 0.7–1.5)
CRP SERPL-MCNC: 22 MG/L — HIGH
EOSINOPHIL # BLD AUTO: 0 K/UL — SIGNIFICANT CHANGE UP (ref 0–0.7)
EOSINOPHIL NFR BLD AUTO: 0 % — SIGNIFICANT CHANGE UP (ref 0–8)
GLUCOSE SERPL-MCNC: 96 MG/DL — SIGNIFICANT CHANGE UP (ref 70–99)
HCT VFR BLD CALC: 39.1 % — SIGNIFICANT CHANGE UP (ref 37–47)
HGB BLD-MCNC: 12.5 G/DL — SIGNIFICANT CHANGE UP (ref 12–16)
LYMPHOCYTES # BLD AUTO: 0.94 K/UL — LOW (ref 1.2–3.4)
LYMPHOCYTES # BLD AUTO: 28.9 % — SIGNIFICANT CHANGE UP (ref 20.5–51.1)
MANUAL SMEAR VERIFICATION: SIGNIFICANT CHANGE UP
MCHC RBC-ENTMCNC: 27 PG — SIGNIFICANT CHANGE UP (ref 27–31)
MCHC RBC-ENTMCNC: 32 G/DL — SIGNIFICANT CHANGE UP (ref 32–37)
MCV RBC AUTO: 84.4 FL — SIGNIFICANT CHANGE UP (ref 81–99)
MONOCYTES # BLD AUTO: 0.37 K/UL — SIGNIFICANT CHANGE UP (ref 0.1–0.6)
MONOCYTES NFR BLD AUTO: 11.4 % — HIGH (ref 1.7–9.3)
NEUTROPHILS # BLD AUTO: 1.93 K/UL — SIGNIFICANT CHANGE UP (ref 1.4–6.5)
NEUTROPHILS NFR BLD AUTO: 59.7 % — SIGNIFICANT CHANGE UP (ref 42.2–75.2)
PLAT MORPH BLD: NORMAL — SIGNIFICANT CHANGE UP
PLATELET # BLD AUTO: 122 K/UL — LOW (ref 130–400)
POLYCHROMASIA BLD QL SMEAR: SLIGHT — SIGNIFICANT CHANGE UP
POTASSIUM SERPL-MCNC: 3.6 MMOL/L — SIGNIFICANT CHANGE UP (ref 3.5–5)
POTASSIUM SERPL-SCNC: 3.6 MMOL/L — SIGNIFICANT CHANGE UP (ref 3.5–5)
PROT SERPL-MCNC: 9.6 G/DL — HIGH (ref 6–8)
RBC # BLD: 4.63 M/UL — SIGNIFICANT CHANGE UP (ref 4.2–5.4)
RBC # FLD: 14 % — SIGNIFICANT CHANGE UP (ref 11.5–14.5)
RBC BLD AUTO: NORMAL — SIGNIFICANT CHANGE UP
SARS-COV-2 RNA SPEC QL NAA+PROBE: SIGNIFICANT CHANGE UP
SMUDGE CELLS # BLD: PRESENT — SIGNIFICANT CHANGE UP
SODIUM SERPL-SCNC: 137 MMOL/L — SIGNIFICANT CHANGE UP (ref 135–146)
WBC # BLD: 3.24 K/UL — LOW (ref 4.8–10.8)
WBC # FLD AUTO: 3.24 K/UL — LOW (ref 4.8–10.8)

## 2021-12-02 PROCEDURE — 73140 X-RAY EXAM OF FINGER(S): CPT | Mod: 26,RT

## 2021-12-02 PROCEDURE — 99285 EMERGENCY DEPT VISIT HI MDM: CPT

## 2021-12-02 PROCEDURE — 99222 1ST HOSP IP/OBS MODERATE 55: CPT

## 2021-12-02 RX ORDER — SODIUM CHLORIDE 9 MG/ML
1000 INJECTION INTRAMUSCULAR; INTRAVENOUS; SUBCUTANEOUS ONCE
Refills: 0 | Status: COMPLETED | OUTPATIENT
Start: 2021-12-02 | End: 2021-12-02

## 2021-12-02 RX ORDER — ACETAMINOPHEN 500 MG
650 TABLET ORAL EVERY 6 HOURS
Refills: 0 | Status: DISCONTINUED | OUTPATIENT
Start: 2021-12-02 | End: 2021-12-02

## 2021-12-02 RX ORDER — MORPHINE SULFATE 50 MG/1
2 CAPSULE, EXTENDED RELEASE ORAL EVERY 6 HOURS
Refills: 0 | Status: DISCONTINUED | OUTPATIENT
Start: 2021-12-02 | End: 2021-12-02

## 2021-12-02 RX ORDER — MORPHINE SULFATE 50 MG/1
4 CAPSULE, EXTENDED RELEASE ORAL ONCE
Refills: 0 | Status: DISCONTINUED | OUTPATIENT
Start: 2021-12-02 | End: 2021-12-02

## 2021-12-02 RX ORDER — CEPHALEXIN 500 MG
1 CAPSULE ORAL
Qty: 28 | Refills: 0
Start: 2021-12-02 | End: 2021-12-08

## 2021-12-02 RX ORDER — VANCOMYCIN HCL 1 G
1500 VIAL (EA) INTRAVENOUS ONCE
Refills: 0 | Status: COMPLETED | OUTPATIENT
Start: 2021-12-02 | End: 2021-12-02

## 2021-12-02 RX ORDER — CEFEPIME 1 G/1
2000 INJECTION, POWDER, FOR SOLUTION INTRAMUSCULAR; INTRAVENOUS ONCE
Refills: 0 | Status: COMPLETED | OUTPATIENT
Start: 2021-12-02 | End: 2021-12-02

## 2021-12-02 RX ADMIN — MORPHINE SULFATE 2 MILLIGRAM(S): 50 CAPSULE, EXTENDED RELEASE ORAL at 08:43

## 2021-12-02 RX ADMIN — MORPHINE SULFATE 4 MILLIGRAM(S): 50 CAPSULE, EXTENDED RELEASE ORAL at 05:05

## 2021-12-02 RX ADMIN — Medication 300 MILLIGRAM(S): at 06:01

## 2021-12-02 RX ADMIN — SODIUM CHLORIDE 2000 MILLILITER(S): 9 INJECTION INTRAMUSCULAR; INTRAVENOUS; SUBCUTANEOUS at 05:05

## 2021-12-02 RX ADMIN — CEFEPIME 100 MILLIGRAM(S): 1 INJECTION, POWDER, FOR SOLUTION INTRAMUSCULAR; INTRAVENOUS at 05:37

## 2021-12-02 NOTE — DISCHARGE NOTE PROVIDER - HOSPITAL COURSE
Ms Anderson is a 61 year old previously healthy female patient who presented on 12/02 for a 3 day history of progressively worsening right 5th digit swelling, redness, and pain after dipping the finger in hot water, found to have a right felon on exam, status post drainage by Orthopedic and evaluation of Burn Team, to be discharged from ED.       For Right Fifth Digit Felon with Pain, Erythema, and Swelling   - s/p IV Cefepime 2g and Vancomycin 1.5g x1 doses in ED  - Evaluated by Burn Team in ED. They lanced the finger: no pus drained. They cleaned the wound.  - Evaluated by Orthopedic Team who drained right felon and changed dressing  - Hand X Ray performed pending read  - On discharge  --> Will ask patient to take Oral Keflex for 7 days as prescribed  --> In case of treatment failure after a week, will consider re-evaluation and IV antibiotics (patient educated and informed about the plan)  --> Outpatient follow up with Hand Office Dr Fisher [phone]  --> Outpatient Follow up X Ray right hand performed (no report yet)  --> Educated patient to apply warm compress/elevate hand

## 2021-12-02 NOTE — DISCHARGE NOTE PROVIDER - CARE PROVIDER_API CALL
Sai Fisher)  Plastic Surgery; Surgery of the Hand  [address] [place] [address], [address]  [place] [address]  Phone: [phone]  Fax: [phone]  Follow Up Time: 1 week

## 2021-12-02 NOTE — PATIENT PROFILE ADULT - FUNCTIONAL SCREEN CURRENT LEVEL: SWALLOWING (IF SCORE 2 OR MORE FOR ANY ITEM, CONSULT REHAB SERVICES), MLM)
"Hypertension, Adult  High blood pressure (hypertension) is when the force of blood pumping through the arteries is too strong. The arteries are the blood vessels that carry blood from the heart throughout the body. Hypertension forces the heart to work harder to pump blood and may cause arteries to become narrow or stiff. Untreated or uncontrolled hypertension can cause a heart attack, heart failure, a stroke, kidney disease, and other problems.  A blood pressure reading consists of a higher number over a lower number. Ideally, your blood pressure should be below 120/80. The first (\"top\") number is called the systolic pressure. It is a measure of the pressure in your arteries as your heart beats. The second (\"bottom\") number is called the diastolic pressure. It is a measure of the pressure in your arteries as the heart relaxes.  What are the causes?  The exact cause of this condition is not known. There are some conditions that result in or are related to high blood pressure.  What increases the risk?  Some risk factors for high blood pressure are under your control. The following factors may make you more likely to develop this condition:  · Smoking.  · Having type 2 diabetes mellitus, high cholesterol, or both.  · Not getting enough exercise or physical activity.  · Being overweight.  · Having too much fat, sugar, calories, or salt (sodium) in your diet.  · Drinking too much alcohol.  Some risk factors for high blood pressure may be difficult or impossible to change. Some of these factors include:  · Having chronic kidney disease.  · Having a family history of high blood pressure.  · Age. Risk increases with age.  · Race. You may be at higher risk if you are .  · Gender. Men are at higher risk than women before age 45. After age 65, women are at higher risk than men.  · Having obstructive sleep apnea.  · Stress.  What are the signs or symptoms?  High blood pressure may not cause symptoms. Very high " blood pressure (hypertensive crisis) may cause:  · Headache.  · Anxiety.  · Shortness of breath.  · Nosebleed.  · Nausea and vomiting.  · Vision changes.  · Severe chest pain.  · Seizures.  How is this diagnosed?  This condition is diagnosed by measuring your blood pressure while you are seated, with your arm resting on a flat surface, your legs uncrossed, and your feet flat on the floor. The cuff of the blood pressure monitor will be placed directly against the skin of your upper arm at the level of your heart. It should be measured at least twice using the same arm. Certain conditions can cause a difference in blood pressure between your right and left arms.  Certain factors can cause blood pressure readings to be lower or higher than normal for a short period of time:  · When your blood pressure is higher when you are in a health care provider's office than when you are at home, this is called white coat hypertension. Most people with this condition do not need medicines.  · When your blood pressure is higher at home than when you are in a health care provider's office, this is called masked hypertension. Most people with this condition may need medicines to control blood pressure.  If you have a high blood pressure reading during one visit or you have normal blood pressure with other risk factors, you may be asked to:  · Return on a different day to have your blood pressure checked again.  · Monitor your blood pressure at home for 1 week or longer.  If you are diagnosed with hypertension, you may have other blood or imaging tests to help your health care provider understand your overall risk for other conditions.  How is this treated?  This condition is treated by making healthy lifestyle changes, such as eating healthy foods, exercising more, and reducing your alcohol intake. Your health care provider may prescribe medicine if lifestyle changes are not enough to get your blood pressure under control, and  if:  · Your systolic blood pressure is above 130.  · Your diastolic blood pressure is above 80.  Your personal target blood pressure may vary depending on your medical conditions, your age, and other factors.  Follow these instructions at home:  Eating and drinking    · Eat a diet that is high in fiber and potassium, and low in sodium, added sugar, and fat. An example eating plan is called the DASH (Dietary Approaches to Stop Hypertension) diet. To eat this way:  ? Eat plenty of fresh fruits and vegetables. Try to fill one half of your plate at each meal with fruits and vegetables.  ? Eat whole grains, such as whole-wheat pasta, brown rice, or whole-grain bread. Fill about one fourth of your plate with whole grains.  ? Eat or drink low-fat dairy products, such as skim milk or low-fat yogurt.  ? Avoid fatty cuts of meat, processed or cured meats, and poultry with skin. Fill about one fourth of your plate with lean proteins, such as fish, chicken without skin, beans, eggs, or tofu.  ? Avoid pre-made and processed foods. These tend to be higher in sodium, added sugar, and fat.  · Reduce your daily sodium intake. Most people with hypertension should eat less than 1,500 mg of sodium a day.  · Do not drink alcohol if:  ? Your health care provider tells you not to drink.  ? You are pregnant, may be pregnant, or are planning to become pregnant.  · If you drink alcohol:  ? Limit how much you use to:  § 0-1 drink a day for women.  § 0-2 drinks a day for men.  ? Be aware of how much alcohol is in your drink. In the [place] one drink equals one 12 oz bottle of beer (355 mL), one 5 oz glass of wine (148 mL), or one 1½ oz glass of hard liquor (44 mL).  Lifestyle    · Work with your health care provider to maintain a healthy body weight or to lose weight. Ask what an ideal weight is for you.  · Get at least 30 minutes of exercise most days of the week. Activities may include walking, swimming, or biking.  · Include exercise to  strengthen your muscles (resistance exercise), such as Pilates or lifting weights, as part of your weekly exercise routine. Try to do these types of exercises for 30 minutes at least 3 days a week.  · Do not use any products that contain nicotine or tobacco, such as cigarettes, e-cigarettes, and chewing tobacco. If you need help quitting, ask your health care provider.  · Monitor your blood pressure at home as told by your health care provider.  · Keep all follow-up visits as told by your health care provider. This is important.  Medicines  · Take over-the-counter and prescription medicines only as told by your health care provider. Follow directions carefully. Blood pressure medicines must be taken as prescribed.  · Do not skip doses of blood pressure medicine. Doing this puts you at risk for problems and can make the medicine less effective.  · Ask your health care provider about side effects or reactions to medicines that you should watch for.  Contact a health care provider if you:  · Think you are having a reaction to a medicine you are taking.  · Have headaches that keep coming back (recurring).  · Feel dizzy.  · Have swelling in your ankles.  · Have trouble with your vision.  Get help right away if you:  · Develop a severe headache or confusion.  · Have unusual weakness or numbness.  · Feel faint.  · Have severe pain in your chest or abdomen.  · Vomit repeatedly.  · Have trouble breathing.  Summary  · Hypertension is when the force of blood pumping through your arteries is too strong. If this condition is not controlled, it may put you at risk for serious complications.  · Your personal target blood pressure may vary depending on your medical conditions, your age, and other factors. For most people, a normal blood pressure is less than 120/80.  · Hypertension is treated with lifestyle changes, medicines, or a combination of both. Lifestyle changes include losing weight, eating a healthy, low-sodium diet,  exercising more, and limiting alcohol.  This information is not intended to replace advice given to you by your health care provider. Make sure you discuss any questions you have with your health care provider.  Document Released: 12/18/2006 Document Revised: 08/28/2019 Document Reviewed: 08/28/2019  Elsevier Patient Education © 2020 [employer]       0 = swallows foods/liquids without difficulty

## 2021-12-02 NOTE — ED ADULT TRIAGE NOTE - CHIEF COMPLAINT QUOTE
Over the weekend I cook [place] and my finger just dipped in the hot water, Monday it was okay, then Tuesday pain started and it got so swollen. I went to urgent  and they gve me some medicine but the pain is too much, I haven't slept since Tuesday - patient

## 2021-12-02 NOTE — CONSULT NOTE ADULT - ASSESSMENT
60 y/o female with right 5th digit pain, swelling, erythema x 3 days, no signs of a burn    Rec:  - admit for IV abx: ?cellulitis vs ?tenosynovitis  - xray  - warm compress/elevation  - no wound care needed  - if no improvement hand surgery c/s 60 y/o female with right 5th digit pain, swelling, erythema x 3 days, no signs of a burn    Rec:  - admit to medicine for IV abx  - xray  - warm compress/elevation  - no wound care needed  - if no improvement hand surgery c/s

## 2021-12-02 NOTE — ED PROVIDER NOTE - ATTENDING CONTRIBUTION TO CARE
pt co R 5th finger pain, swelling, after touchign hot water over the weekend. getting worse. no fever. worse with movement.    right 5th finger symmetrically swollen, red, very tender, no crep. nml cap refill.     will get labs, abx, burn eval.

## 2021-12-02 NOTE — DISCHARGE NOTE PROVIDER - NSDCFUADDAPPT_GEN_ALL_CORE_FT
Outpatient follow up with Hand Office Dr Fisher [phone]  APPTS ARE READY TO BE MADE: [x] YES    Best Family or Patient Contact (if needed):    Additional Information about above appointments (if needed):    1: Outpatient follow up with Hand Office Dr Fisher [phone]  2:   3:     Other comments or requests:

## 2021-12-02 NOTE — DISCHARGE NOTE NURSING/CASE MANAGEMENT/SOCIAL WORK - NSDCFUADDAPPT_GEN_ALL_CORE_FT
APPTS ARE READY TO BE MADE: [x] YES    Best Family or Patient Contact (if needed):    Additional Information about above appointments (if needed):    1: Outpatient follow up with Hand Office Dr Fisher [phone]  2:   3:     Other comments or requests:

## 2021-12-02 NOTE — ED ADULT NURSE NOTE - CHIEF COMPLAINT QUOTE
Over the weekend I cook [place] and my finger just dipped in the hot water, Monday it was okay, then Tuesday pain started and it got so swollen. I went to urgent  and they gve me some medicine but the pain is too much, I haven't slept since Tuesday - patient

## 2021-12-02 NOTE — DISCHARGE NOTE PROVIDER - NSDCMRMEDTOKEN_GEN_ALL_CORE_FT
albuterol 90 mcg/inh inhalation aerosol: 2 puff(s) inhaled every 4 hours, As Needed -for shortness of breath and/or wheezing   cephalexin 250 mg oral tablet: 1 tab(s) orally every 6 hours   predniSONE 50 mg oral tablet: 1 tab(s) orally once a day   Tessalon Perles 100 mg oral capsule: 1 cap(s) orally every 8 hours

## 2021-12-02 NOTE — ED PROVIDER NOTE - NS ED ROS FT
Review of Systems:  •	CONSTITUTIONAL - No fever, No diaphoresis, No weight change  •	SKIN - No rash  •	HEMATOLOGIC - No abnormal bleeding or bruising  •	EYES - No eye pain, No blurred vision  •	ENT - No change in hearing, No sore throat, No neck pain, No rhinorrhea, No ear pain  •	RESPIRATORY - No shortness of breath, No cough  •	CARDIAC -No chest pain, No palpitations  •	GI - No abdominal pain, No nausea, No vomiting, No diarrhea, No constipation, No bright red blood per rectum or melena. No flank pain  •	MUSCULOSKELETAL - No joint paint, No back pain, + swelling and pain of right fifth digit  •	NEUROLOGIC - No numbness, No focal weakness, No headache, No dizziness  All other systems negative, unless specified in HPI

## 2021-12-02 NOTE — DISCHARGE NOTE NURSING/CASE MANAGEMENT/SOCIAL WORK - PATIENT PORTAL LINK FT
You can access the FollowMyHealth Patient Portal offered by [facility] by registering at the following website: [email or web address]. By joining [employer]’s FollowMyHealth portal, you will also be able to view your health information using other applications (apps) compatible with our system.

## 2021-12-02 NOTE — H&P ADULT - NSHPPHYSICALEXAM_GEN_ALL_CORE
- Physical Exam in ED  * General Appearance: Alert, cooperative, interactive, oriented to time, place, and person, in no acute distress  * Head: Normocephalic, without obvious abnormality, atraumatic  * Lungs: Respirations unlabored, Good bilateral air entry, normal breath sounds (Clear to auscultation bilaterally, no audible wheezes, crackles, or rhonchi)  * Heart: Regular Rate and Rhythm, normal S1 and S2, no audible murmur, rub, or gallop  * Abdomen: Symmetric, non-distended, no scar, soft, non-tender, bowel sounds active all four quadrants, no masses, no organomegaly (no hepatosplenomegaly)l  * L Hand 0.5cm incision over ulnar fingertip, Swelling of P1 and P2, No flexor sheath TTP. No fusiform swelling . SF mot helf in flexed position , No pain with passive extension, firing fdp/fds all digits, Able to extend all digitsm, Motor: AIN/PIN/Ulnar intact. Sensory: Ax/M/R/U, radial/ulnar digital nerves intact. Vasc: hand WWP, 2+ radial pulse  * Skin: Skin color, texture, turgor normal, no rashes or lesions  * Lymph nodes: Cervical, supraclavicular, and axillary nodes normal

## 2021-12-02 NOTE — DISCHARGE NOTE PROVIDER - NSDCCPCAREPLAN_GEN_ALL_CORE_FT
PRINCIPAL DISCHARGE DIAGNOSIS  Diagnosis: Felon of finger  Assessment and Plan of Treatment: For Right Fifth Digit Felon with Pain, Erythema, and Swelling   - s/p IV Cefepime 2g and Vancomycin 1.5g x1 doses in ED  - Evaluated by Burn Team in ED. They lanced the finger: no pus drained. They cleaned the wound.  - Evaluated by Orthopedic Team who drained right felon and changed dressing  - Hand X Ray performed pending read  - On discharge  --> Will ask patient to take Oral Keflex for 7 days as prescribed  --> In case of treatment failure after a week, will consider re-evaluation and IV antibiotics (patient educated and informed about the plan)  --> Outpatient follow up with Hand Office Dr Fisher [phone]  --> Outpatient Follow up X Ray right hand performed (no report yet)  --> Educated patient to apply warm compress/elevate hand

## 2021-12-02 NOTE — H&P ADULT - ATTENDING COMMENTS
Patient seen and examined independently. Agree with resident note/ history / physical exam and plan of care with following exceptions/additions/updates. Case discussed with patient/pt decision maker, house-staff and nursing. My notes supersedes the resident's notes in case of discrepancies.    wound on the left 5th finger,   pt was seen by ortho and cleared for discharge and followup as outpt, recommended po abx for 7 days. ( keflex)   discharge home

## 2021-12-02 NOTE — ED PROVIDER NOTE - PROGRESS NOTE DETAILS
MQ: Dipped right 5th digit on boiling water 5 days ago, urgent care gave keflex, tdap, and ibuprofen, still in pain, consulted burn, will come and see patient MQ: Burn PA says no intervention from their end, consulted hand surgery and will see patient, given IV abx and fluids, obtain labs, and admit

## 2021-12-02 NOTE — CONSULT NOTE ADULT - SUBJECTIVE AND OBJECTIVE BOX
62 y/o female denies any pmhx presents to ED c/o right 5th digit pain since Tuesday (3 days ago). Patient states the only trauma she can recall is when she was filling a pot with hot water from the faucet on Saturday and her finger accidentally dipped into it. She immediately took her finger out, denied any pain at that time. States there were no blisters or denuded skin. Then on Tuesday she began to notice swelling and the finger became painful to move. She took 200mg Motrin without relief. On Wednesday she went to urgent care where they gave her Tdap, Ibuprofen 600mg, Keflex and lanced the pulp of the finger. Patient stated blood was expressed only. Patient states the pain is constant and throbbing mostly in the distal aspect of the finger progressing towards her PIP. Denies fever, cough, chills, numbness, tingling, n/v/d.      PAST MEDICAL & SURGICAL HISTORY:  No pertinent past medical history    No significant past surgical history    Allergies    No Known Allergies    Intolerances      Vital Signs Last 24 Hrs  T(C): 37.3 (02 Dec 2021 03:35), Max: 37.3 (02 Dec 2021 03:35)  T(F): 99.2 (02 Dec 2021 03:35), Max: 99.2 (02 Dec 2021 03:35)  HR: 100 (02 Dec 2021 03:35) (100 - 100)  BP: 145/83 (02 Dec 2021 03:35) (145/83 - 145/83)  BP(mean): --  ABP: --  ABP(mean): --  RR: 16 (02 Dec 2021 04:30) (16 - 24)  SpO2: 99% (02 Dec 2021 03:35) (99% - 99%)                  MEDICATIONS  (STANDING):  cefepime   IVPB 2000 milliGRAM(s) IV Intermittent once  morphine  - Injectable 4 milliGRAM(s) IV Push Once  sodium chloride 0.9% Bolus 1000 milliLiter(s) IV Bolus once  vancomycin  IVPB 1500 milliGRAM(s) IV Intermittent Once    MEDICATIONS  (PRN):      PE:  Gen: NAD, sitting on exam table holding right hand  Skin: right 5th digit, + erythema, + edema to finger, decreased ROM 2/2 pain, finger slightly flexed on extension, +ttp, dried blood noted to pulp of finger where lanced, no signs of a burn, + radial pulse 2+, some erythema noted to volar aspect of hand, no ttp or decreased ROM to remaining digits or wrist.    ***Distal tip of finger cleaned and lanced at bedside, no fluid, blood, or pus expressed; wound cleaned and wrapped; pt monico well***

## 2021-12-02 NOTE — CONSULT NOTE ADULT - SUBJECTIVE AND OBJECTIVE BOX
Orthopaedics Consult Note    MARIZA NFONOYIM  [id number]    Time consult called: 515 am  Time patient seen: 530 am    Patient is a 61y year old Female RHD with 4 days history of right SF pain after burning her fingertip in hot water. Was seen in urgent care yesterday where an I&D of the fingertip was made with little relief in her pain. Denies numbness or tingling. Denies f/c/n/v/sob/cp. Given cefepime and vancomycin in ED.     PMH/PSH  ^FINGER INJURY    MEWS Score    No pertinent past medical history    No significant past surgical history    FINGER INJURY    90+    SysAdmin_VstLnk        Medications      Allergies  No Known Allergies        T(C): 37.3 (12-02-21 @ 03:35), Max: 37.3 (12-02-21 @ 03:35)  HR: 100 (12-02-21 @ 03:35) (100 - 100)  BP: 145/83 (12-02-21 @ 03:35) (145/83 - 145/83)  RR: 16 (12-02-21 @ 04:30) (16 - 24)  SpO2: 99% (12-02-21 @ 03:35) (99% - 99%)    Physical Exam  NAD  Breathing comfortably on RA  Resting comfortably    L Hand  0.5cm incision over ulnar fingertip  Swelling of P1 and P2  No flexor sheath TTP  No fusiform swelling   SF mot helf in flexed position   No pain with passive extension  Firing fdp/fds all digits  Able to extend all digits  Motor: AIN/PIN/Ulnar intact  Sensory: Ax/M/R/U, radial/ulnar digital nerves intact  Vasc: hand WWP, 2+ radial pulse    Labs                        12.5   3.24  )-----------( 122      ( 02 Dec 2021 05:15 )             39.1     12-02    137  |  103  |  12  ----------------------------<  96  3.6   |  19  |  0.7    Ca    8.7      02 Dec 2021 05:15    TPro  9.6<H>  /  Alb  3.7  /  TBili  0.3  /  DBili  x   /  AST  26  /  ALT  17  /  AlkPhos  45  12-02    LIVER FUNCTIONS - ( 02 Dec 2021 05:15 )  Alb: 3.7 g/dL / Pro: 9.6 g/dL / ALK PHOS: 45 U/L / ALT: 17 U/L / AST: 26 U/L / GGT: x               Img  XR right SF: no acute fractures, dislocation, or foreign bodies. No cortical irregularity      Procedure  6cc 1% lidocaine used for right SF digital block, mid-axial incision made over radial aspect of right SF distal phalanx made. culture of fluid taken, no kiran pus. Irrigation and debridement performed, packed with wick in incision to allow for drainage and dressed in gerardo dressing.    A/P: Patient is a 61y year old Female with left IF felon s/p bedside I&D. No leukocytosis or systemic symptoms. Pt admitted to medicine per ED    NWB R SF  Tetanus ppx  F/u culture  F/u ESR/CRP  pain control prn  Start TID soaks with warm soapy water  Dressing/wound care instructions provided  Return to clinic: Hand offic hours with Dr. Fisher, call [phone] to schedule an appointment  Return to ED with uncontrolled pain/bleeding/fever/chills/numbness/tingling/cool extremity/inability to move extremity             Orthopaedics Consult Note    MARIZA NFONOYIM  [id number]    Time consult called: 515 am  Time patient seen: 530 am    Patient is a 61y year old Female RHD with 4 days history of right SF pain after burning her fingertip in hot water. Was seen in urgent care yesterday where an I&D of the fingertip was made with little relief in her pain. Denies numbness or tingling. Denies f/c/n/v/sob/cp. Given cefepime and vancomycin in ED.     PMH/PSH  ^FINGER INJURY    MEWS Score    No pertinent past medical history    No significant past surgical history    FINGER INJURY    90+    SysAdmin_VstLnk        Medications      Allergies  No Known Allergies        T(C): 37.3 (12-02-21 @ 03:35), Max: 37.3 (12-02-21 @ 03:35)  HR: 100 (12-02-21 @ 03:35) (100 - 100)  BP: 145/83 (12-02-21 @ 03:35) (145/83 - 145/83)  RR: 16 (12-02-21 @ 04:30) (16 - 24)  SpO2: 99% (12-02-21 @ 03:35) (99% - 99%)    Physical Exam  NAD  Breathing comfortably on RA  Resting comfortably    L Hand  0.5cm incision over ulnar fingertip  Swelling of P1 and P2  No flexor sheath TTP  No fusiform swelling   SF mot helf in flexed position   No pain with passive extension  Firing fdp/fds all digits  Able to extend all digits  Motor: AIN/PIN/Ulnar intact  Sensory: Ax/M/R/U, radial/ulnar digital nerves intact  Vasc: hand WWP, 2+ radial pulse    Labs                        12.5   3.24  )-----------( 122      ( 02 Dec 2021 05:15 )             39.1     12-02    137  |  103  |  12  ----------------------------<  96  3.6   |  19  |  0.7    Ca    8.7      02 Dec 2021 05:15    TPro  9.6<H>  /  Alb  3.7  /  TBili  0.3  /  DBili  x   /  AST  26  /  ALT  17  /  AlkPhos  45  12-02    LIVER FUNCTIONS - ( 02 Dec 2021 05:15 )  Alb: 3.7 g/dL / Pro: 9.6 g/dL / ALK PHOS: 45 U/L / ALT: 17 U/L / AST: 26 U/L / GGT: x               Img  XR right SF: no acute fractures, dislocation, or foreign bodies. No cortical irregularity      Procedure  6cc 1% lidocaine used for right SF digital block, mid-axial incision made over radial aspect of right SF distal phalanx made. culture of fluid taken, no kiran pus. Irrigation and debridement performed, packed with wick in incision to allow for drainage and dressed in gerardo dressing.    A/P: Patient is a 61y year old Female with left IF felon s/p bedside I&D. No leukocytosis or systemic symptoms. Pt admitted to medicine per ED    NWB R SF  Tetanus ppx  F/u culture  F/u ESR/CRP  Keflex x7 days  pain control prn  Start TID soaks with warm soapy water  Dressing/wound care instructions provided  Return to clinic: Hand offic hours with Dr. Fsiher, call [phone] to schedule an appointment  Return to ED with uncontrolled pain/bleeding/fever/chills/numbness/tingling/cool extremity/inability to move extremity

## 2021-12-02 NOTE — DISCHARGE NOTE PROVIDER - NSDCFUSCHEDAPPT_GEN_ALL_CORE_FT
MARIZA ABREU ; 12/13/2021 ; [facility] [address] [place] [address]  MARIZA ABREU ; 01/11/2022 ; [facility] [address] [place] [address]

## 2021-12-02 NOTE — ED PROVIDER NOTE - CADM POA CENTRAL LINE
"Chief Complaint   Patient presents with     Physical     /70   Pulse 64   Temp 97.5  F (36.4  C) (Oral)   Resp 16   Ht 1.626 m (5' 4\")   Wt 62.1 kg (137 lb)   SpO2 98%   BMI 23.52 kg/m   Estimated body mass index is 23.52 kg/m  as calculated from the following:    Height as of this encounter: 1.626 m (5' 4\").    Weight as of this encounter: 62.1 kg (137 lb).  bp completed using cuff size: regular       Health Maintenance addressed:  Pap Smear  Mammogram  Colonoscopy.       Possibly completing today per provider review.  Pended phone number pt will schedule.     Olivia Che CMA, MA     " No

## 2021-12-02 NOTE — ED PROVIDER NOTE - PHYSICAL EXAMINATION
CONSTITUTIONAL: Well-developed; well-nourished; in no acute distress.   SKIN: warm, dry  HEAD: Normocephalic; atraumatic.  EYES: no conjunctival injection. PERRL.   ENT: No nasal discharge; airway clear.  NECK: Supple; non tender.  CARD: S1, S2 normal; no murmurs, gallops, or rubs. Regular rate and rhythm.   RESP: No wheezes, rales or rhonchi.  EXT: Decreased flexion/extension of right fifth digit due to pain, moderate swelling and tenderness to pain in distal part of right fifth finger, no redness or peeling skin.  LYMPH: No acute cervical adenopathy.  NEURO: Alert, oriented, grossly unremarkable  PSYCH: Cooperative, appropriate.

## 2021-12-02 NOTE — PATIENT PROFILE ADULT - FALL HARM RISK - UNIVERSAL INTERVENTIONS
Bed in lowest position, wheels locked, appropriate side rails in place/Call bell, personal items and telephone in reach/Instruct patient to call for assistance before getting out of bed or chair/Non-slip footwear when patient is out of bed/[place] to call system/Physically safe environment - no spills, clutter or unnecessary equipment/Purposeful Proactive Rounding/Room/bathroom lighting operational, light cord in reach

## 2021-12-02 NOTE — ED PROVIDER NOTE - OBJECTIVE STATEMENT
Patient is a 60 yo female with no sig PMHx c/o right finger pain since 5 days ago. 5 days ago, patient accidentally dipped right fifth finger in boiling water, had no pain afterwards, but started with pain 2 days ago, seen in urgent care and given keflex, tdap and ibuprofen, but had no relief in pain. Tonight, patient came to the ED, because the pain in the right fifth digit is severe, sharp, constant. Denies fever, chills, n/v, numbness/weakness.

## 2021-12-02 NOTE — DISCHARGE NOTE NURSING/CASE MANAGEMENT/SOCIAL WORK - NSDCPEFALRISK_GEN_ALL_CORE
For information on Fall & Injury Prevention, visit: [email or web address] OR  [email or web address] OR  https://www.cdc.gov/steadi/patient.html

## 2021-12-02 NOTE — H&P ADULT - HISTORY OF PRESENT ILLNESS
History of Present Illness    Ms. Anderson is a 61 year old female patient known to have:  - Negative past medical history      She presented to the ED on 12/02 for right 5th digit pain associated with swelling and redness.  History goes back to Saturday when she was cooking [place] and dipped her right 5th finger into hot water by mistake.  This was followed days later by progressively worsening throbbing pain, swelling, and redness.        On review of systems, patient denies any recent fever, chills, night sweats, URTI symptoms (cough, rhinorrhea, sore throat), urinary symptoms (urinary frequency, urgency, intermittence, dysuria, foul smelling urine, cloudy urine), change in bowel movements (diarrhea or constipation), abdominal pain, headache, nausea, or vomiting. No sick contacts. No recent travel or exposure to recent travelers.      Patient presented to Urgent Care on 12/01: s/p TdaP, Ibuprofen 600mg x1 dose. They lanced the pulp of finger but only blood was expressed. No pus or drainage. She was asked to start Kflex of which patient only received 3 doses on 12/01.      Upon presentation to the ED, the patient was hemodynamically stable:  Vital Signs in ED  - /83mmHg  - RR 24  - HR 100bpm  - T 37.3      Investigations   Laboratory Workup  - CBC:                        12.5   3.24  )-----------( 122      ( 02 Dec 2021 05:15 )             39.1     - Chemistry:  12-02    137  |  103  |  12  ----------------------------<  96  3.6   |  19  |  0.7    Ca    8.7      02 Dec 2021 05:15    TPro  9.6<H>  /  Alb  3.7  /  TBili  0.3  /  DBili  x   /  AST  26  /  ALT  17  /  AlkPhos  45  12-02     Radiology   Hand X Ray performed pending read      - Patient received Cefepime 2g and Vancomycin 1.5g x1 doses in ED  - Patient was evaluated by Burn Team in ED. They lanced the finger: no pus drained. They cleaned the wound.  - She was also evaluated by Orthopedic Team who drained right felon and changed dressing  - They recommended discharging patient on PO Keflex for 7 days.  - In case of treatment failure, patient educated to come back to the ED.

## 2021-12-02 NOTE — PATIENT PROFILE ADULT - FALL HARM RISK - FALLEN IN PAST
[address] Daily Progress Note      Admit Date:  9/26/2018           Reason for Consultation/Chief Complaint: \"I have been having shortness of breath . \"  Without nasal O2 sat drop to 85% with symptoms of shortness of breath. She is seen by cardiology for diastolic CHF and parox afib. She is being treated for rate and rhythm control  Dry cough. Slept well last night without any chest pain sob or palp. She uses CPAP at night. She ambulated in [place] yesterday with a walker under supervision of PT. History of Present Illness:  Jayme Roper is a 70 y.o. patient who presented to the hospital with complaints of acute onset of shortness of breath. She has history of CHF and use to be on diuretics and those were stopped since got dehydrated. She had one day [address] trip total 6 hr driving. She also has cough with pale sputum. No fever or hemoptysis. No chest pain. she has BETSY and uses CPAP at night.    I have been asked to provide consultation regarding further management and testing.     ROS:  12 point ROS negative in all areas as listed below except as in [place]  Constitutional, EENT, Cardiovascular, pulmonary, GI, , Musculoskeletal, skin, neurological, hematological, endocrine, Psychiatric    Past Medical History:   Diagnosis Date    Anemia, unspecified 2010    neg bone marrow    Asthma     Baker's cyst     Blind left eye     Carotid artery stenoses     Carotid stenosis 2002    left    Cervical spinal cord compression ([address] 11/2010    Chronic midline low back pain with right-sided sciatica 8/9/2016    CKD (chronic kidney disease) stage 3, GFR 30-59 ml/min     Community acquired pneumonia of left lower lobe of lung ([address] 9/8/2018    CRI     Detached retina, left     Diverticulosis     DJD (degenerative joint disease)     Edema     chronic    Fatty liver     GERD (gastroesophageal reflux disease)     Herniated lumbar intervertebral disc     Hyperlipidemia     Hypertension     Hypothyroid No

## 2021-12-07 DIAGNOSIS — L03.011 CELLULITIS OF RIGHT FINGER: ICD-10-CM

## 2021-12-07 DIAGNOSIS — Z79.52 LONG TERM (CURRENT) USE OF SYSTEMIC STEROIDS: ICD-10-CM

## 2021-12-07 LAB
CULTURE RESULTS: SIGNIFICANT CHANGE UP
CULTURE RESULTS: SIGNIFICANT CHANGE UP
SPECIMEN SOURCE: SIGNIFICANT CHANGE UP
SPECIMEN SOURCE: SIGNIFICANT CHANGE UP

## 2021-12-10 ENCOUNTER — APPOINTMENT (OUTPATIENT)
Dept: PLASTIC SURGERY | Facility: CLINIC | Age: 61
End: 2021-12-10
Payer: COMMERCIAL

## 2021-12-10 ENCOUNTER — RESULT REVIEW (OUTPATIENT)
Age: 61
End: 2021-12-10

## 2021-12-10 ENCOUNTER — OUTPATIENT (OUTPATIENT)
Dept: OUTPATIENT SERVICES | Facility: HOSPITAL | Age: 61
LOS: 1 days | Discharge: HOME | End: 2021-12-10
Payer: COMMERCIAL

## 2021-12-10 DIAGNOSIS — L03.019 CELLULITIS OF UNSPECIFIED FINGER: ICD-10-CM

## 2021-12-10 PROCEDURE — 73130 X-RAY EXAM OF HAND: CPT | Mod: 26,RT

## 2021-12-10 PROCEDURE — 99212 OFFICE O/P EST SF 10 MIN: CPT | Mod: 25

## 2021-12-10 PROCEDURE — 26010 DRAINAGE OF FINGER ABSCESS: CPT

## 2021-12-10 RX ORDER — AMOXICILLIN AND CLAVULANATE POTASSIUM 500; 125 MG/1; MG/1
500-125 TABLET, FILM COATED ORAL TWICE DAILY
Qty: 14 | Refills: 0 | Status: ACTIVE | COMMUNITY
Start: 2021-12-10 | End: 1900-01-01

## 2021-12-10 NOTE — HISTORY OF PRESENT ILLNESS
[FreeTextEntry1] : 59 yo RHD F with 1 year history of right wrist pain of thumb.  She reports that the right thumb cannot be extended or flexed due to pain over the back of thumb/wrist and the palmar side of thumb.  She reports 2 month history of thumb spica splint use with some relief in order to work.  But still has persistent right wrist pain, which has been worsening over 1 year.   Pt was started on nabumetone for wrist pain with some relief.  She reports no prior surgical treatments for current complaint or prior UE surgeries.  She was referred by her Rheumatologist- Dr. Colindres for treatment of suspected DeQuervain's syndrome.  \par \par Denies numbness, redness or swelling in hands/fingers.\par \par Interval hx (7/19/19):  Here for follow up after kenalog injection to right 1st extensor and trigger thumb.  Compliant with right thumb spica splint.  Reports improvement of pain in right wrist and thumb.   thumb still with difficulty flexing.  New c/o right radial wrist decreased sensation for the last 2 weeks.  Denies hand/wrist trauma.\par \par Interval hx (9/6/19): Pt presents for f/u after repeat kenalog injection to right trigger thumb.  patient feels much better since last kenalog injection to right trigger thumb.  She reports thumb spica splint use.  She feels right wrist stiffness.  Denies clicking, popping, wrist pain\par \par Interval hx (12/1/21):  Right small finger pain and swelling over 1 week ago.  She went to  where she was treated with radial I&D for felon (11/1/21), followed by return to [facility] ED 11/2/21 for repeat I&D by hand surgery and discharged with PO keflex x 7 days. She performed hand soaks with soap TID.  Denies fevers, chills, redness.  BCx were negative. no kiran pus noted on repeat I&D.  Pt believes the swelling has improved.

## 2021-12-10 NOTE — PROCEDURE
[Nl] : None [FreeTextEntry1] : right 5th felon, recurrent [FreeTextEntry2] : right 5th felon I&D [FreeTextEntry3] : digital block [FreeTextEntry6] : B/R/A reviewed in detail\par Informed consent obtained\par Skin prepared\par Digital blockade performed\par Prior incisions re-opened\par Seropurulent fluid drainage from radial incision\par Culture sent\par Betadine soak performed\par bacitracin/packing placed [FreeTextEntry7] : wound culture sent

## 2021-12-10 NOTE — PHYSICAL EXAM
[de-identified] : well-appearing, NAD [de-identified] : right UE: normal tenodesis and finger cascade, no guarding of radial wrist and thumb, intact FDS/FDP/extensors, tenderness over CMC, negative grind test, nontender 1st extensor compartment, negative finkelstein test; no triggering of thumb with nontender palpable volar nodule at A1 pulley\par right wrist with decreased extensor ROM compared to left, new left 5th digit tender finger pad with seropurulent drainage from radial incision, BL incision reopened with minimal seropurulent drainage radially, culture sent, betadine soaks and packing performed

## 2021-12-10 NOTE — ASSESSMENT
[FreeTextEntry1] : 57 yo F with with right wrist DeQuervain's tendonitis, resolved s/p kenalog (1); and asymptomatic right trigger thumb\par s/p kenalog injection x 2 (right trigger thumb)\par Right wrist stiffness improved\par \par Right 5th felon s/p I&D x 2 in UC and ED.\par persistent swelling and pain\par Recurrent felon, I&D in office today with minimal seropurulent fluid\par \par -Cultures sent, follow up \par -left hand x-ray, r/o OM\par -start new rx Augmentin\par -hand soaks BID, bacitracin/packing BID\par -no work at this time with open wound\par -NSAIDS PRN pain\par -All questions were answered\par -Call for new redness, worsening pain, fevers\par -will address ear tear eval at a later date after felon resolution\par -Follow up 1 week\par \par Due to COVID-19, pre-visit patient instructions were explained to the patient and their symptoms were checked upon arrival. Masks were used by the healthcare provider and staff and the examination room was cleaned after the patient visit concluded\par

## 2021-12-13 ENCOUNTER — NON-APPOINTMENT (OUTPATIENT)
Age: 61
End: 2021-12-13

## 2021-12-13 ENCOUNTER — APPOINTMENT (OUTPATIENT)
Dept: OBGYN | Facility: CLINIC | Age: 61
End: 2021-12-13
Payer: COMMERCIAL

## 2021-12-13 PROCEDURE — 99213 OFFICE O/P EST LOW 20 MIN: CPT

## 2021-12-16 LAB — BACTERIA WND CULT: ABNORMAL

## 2021-12-17 ENCOUNTER — APPOINTMENT (OUTPATIENT)
Dept: PLASTIC SURGERY | Facility: CLINIC | Age: 61
End: 2021-12-17
Payer: COMMERCIAL

## 2021-12-17 PROCEDURE — 99024 POSTOP FOLLOW-UP VISIT: CPT

## 2021-12-17 RX ORDER — ACETAMINOPHEN 500 MG/1
500 TABLET, COATED ORAL
Qty: 60 | Refills: 1 | Status: ACTIVE | COMMUNITY
Start: 2021-12-17 | End: 1900-01-01

## 2021-12-17 NOTE — HISTORY OF PRESENT ILLNESS
[FreeTextEntry1] : 57 yo RHD F with 1 year history of right wrist pain of thumb.  She reports that the right thumb cannot be extended or flexed due to pain over the back of thumb/wrist and the palmar side of thumb.  She reports 2 month history of thumb spica splint use with some relief in order to work.  But still has persistent right wrist pain, which has been worsening over 1 year.   Pt was started on nabumetone for wrist pain with some relief.  She reports no prior surgical treatments for current complaint or prior UE surgeries.  She was referred by her Rheumatologist- Dr. Colindres for treatment of suspected DeQuervain's syndrome.  \par \par Denies numbness, redness or swelling in hands/fingers.\par \par Interval hx (7/19/19):  Here for follow up after kenalog injection to right 1st extensor and trigger thumb.  Compliant with right thumb spica splint.  Reports improvement of pain in right wrist and thumb.   thumb still with difficulty flexing.  New c/o right radial wrist decreased sensation for the last 2 weeks.  Denies hand/wrist trauma.\par \par Interval hx (9/6/19): Pt presents for f/u after repeat kenalog injection to right trigger thumb.  patient feels much better since last kenalog injection to right trigger thumb.  She reports thumb spica splint use.  She feels right wrist stiffness.  Denies clicking, popping, wrist pain\par \par Interval hx (12/1/21):  Right small finger pain and swelling over 1 week ago.  She went to  where she was treated with radial I&D for felon (11/1/21), followed by return to [facility] ED 11/2/21 for repeat I&D by hand surgery and discharged with PO keflex x 7 days. She performed hand soaks with soap TID.  Denies fevers, chills, redness.  BCx were negative. no kiran pus noted on repeat I&D.  Pt believes the swelling has improved. \par \par Interval hx (12/17/21): 1 week s/p repeat I&D of right 5th felon. compliant with soapy hand soaks

## 2021-12-17 NOTE — DATA REVIEWED
[FreeTextEntry1] : Wound cx (12/10/21\par \par Few Coag Negative Staphylococcus Multiple Morphological Strains

## 2021-12-17 NOTE — ASSESSMENT
[FreeTextEntry1] : 57 yo F with with right wrist DeQuervain's tendonitis, resolved s/p kenalog (1); and asymptomatic right trigger thumb\par s/p kenalog injection x 2 (right trigger thumb)\par Right wrist stiffness improved\par \par Right 5th felon s/p I&D x 2 in UC and ED.\par persistent swelling and pain\par Recurrent felon, I&D in office today with minimal seropurulent fluid.  REsolving - no OM\par left earlobe tear, well healed\par \par -Cultures reviewed - staph coag neg, no susceptibilities performed\par -left hand x-ray reviewed - no OM.\par -completed Augmentin this AM\par -bacitracin BID x 1 week, then aquaphor BID\par -no work at this time with open wound\par -NSAIDS PRN pain\par -All questions were answered\par -Call for new redness, worsening pain, fevers\par -will schedule left ear tear repair at a later date after felon resolution.  B/R/A reviewed in detail.\par -Follow up 2 weeks for possible work clearance after wound evaluation\par \par Due to COVID-19, pre-visit patient instructions were explained to the patient and their symptoms were checked upon arrival. Masks were used by the healthcare provider and staff and the examination room was cleaned after the patient visit concluded\par 
164

## 2021-12-17 NOTE — PHYSICAL EXAM
[de-identified] : well-appearing, NAD [de-identified] : right UE: normal tenodesis and finger cascade, no guarding of radial wrist and thumb, intact FDS/FDP/extensors, tenderness over CMC, negative grind test, nontender 1st extensor compartment, negative finkelstein test; no triggering of thumb with nontender palpable volar nodule at A1 pulley\par right wrist with decreased extensor ROM compared to left, new left 5th digit minimally tender finger pad with healing I&D sites with no drainage, improving edema, no erythema [de-identified] : left earlobe tear wound, healed

## 2021-12-30 ENCOUNTER — APPOINTMENT (OUTPATIENT)
Dept: PLASTIC SURGERY | Facility: CLINIC | Age: 61
End: 2021-12-30
Payer: COMMERCIAL

## 2021-12-30 PROCEDURE — 99212 OFFICE O/P EST SF 10 MIN: CPT

## 2021-12-30 RX ORDER — METHYLPREDNISOLONE 4 MG/1
4 TABLET ORAL
Qty: 1 | Refills: 0 | Status: ACTIVE | COMMUNITY
Start: 2021-12-30 | End: 1900-01-01

## 2021-12-30 NOTE — PHYSICAL EXAM
[de-identified] : well-appearing, NAD [de-identified] : right UE: normal tenodesis and finger cascade, no guarding of radial wrist and thumb, intact FDS/FDP/extensors, tenderness over CMC, negative grind test, nontender 1st extensor compartment, negative Finkelstein test; no triggering of thumb with nontender palpable volar nodule at A1 pulley\par right wrist with decreased extensor ROM compared to left, \par new right 5th digit minimally tender finger pad with healing I&D sites with no drainage, no open wounds, improving edema, no erythema [de-identified] : left earlobe tear wound, healed

## 2021-12-30 NOTE — HISTORY OF PRESENT ILLNESS
[FreeTextEntry1] : 59 yo RHD F with 1 year history of right wrist pain of thumb.  She reports that the right thumb cannot be extended or flexed due to pain over the back of thumb/wrist and the palmar side of thumb.  She reports 2 month history of thumb spica splint use with some relief in order to work.  But still has persistent right wrist pain, which has been worsening over 1 year.   Pt was started on nabumetone for wrist pain with some relief.  She reports no prior surgical treatments for current complaint or prior UE surgeries.  She was referred by her Rheumatologist- Dr. Colindres for treatment of suspected DeQuervain's syndrome.  \par \par Denies numbness, redness or swelling in hands/fingers.\par \par Interval hx (7/19/19):  Here for follow up after kenalog injection to right 1st extensor and trigger thumb.  Compliant with right thumb spica splint.  Reports improvement of pain in right wrist and thumb.   thumb still with difficulty flexing.  New c/o right radial wrist decreased sensation for the last 2 weeks.  Denies hand/wrist trauma.\par \par Interval hx (9/6/19): Pt presents for f/u after repeat kenalog injection to right trigger thumb.  patient feels much better since last kenalog injection to right trigger thumb.  She reports thumb spica splint use.  She feels right wrist stiffness.  Denies clicking, popping, wrist pain\par \par Interval hx (12/1/21):  Right small finger pain and swelling over 1 week ago.  She went to  where she was treated with radial I&D for felon (11/1/21), followed by return to [facility] ED 11/2/21 for repeat I&D by hand surgery and discharged with PO keflex x 7 days. She performed hand soaks with soap TID.  Denies fevers, chills, redness.  BCx were negative. no kiran pus noted on repeat I&D.  Pt believes the swelling has improved. \par \par Interval hx (12/17/21): 1 week s/p repeat I&D of right 5th felon. compliant with soapy hand soaks\par \par Interval hx (12/30/21). Pt presents today for f/u 2 weeks s/p repeat I&D of right 5th finger felon c/o persistent pain and swelling, slightly better compared to last visit. Denies any f/c, open wounds or drainage.

## 2021-12-30 NOTE — DATA REVIEWED
[FreeTextEntry1] : Wound cx (12/10/21\par \par Few Coag Negative Staphylococcus Multiple Morphological Strains\par \par \par  Xray Hand 3 Views, Right             Final\par \par No Documents Attached\par \par \par \par \par   ACC: [id number]     EXAM:  XR HAND MIN 3 VIEWS RT\par \par PROCEDURE DATE:  12/10/2021\par \par \par \par INTERPRETATION:  Clinical History / Reason for exam: Pain in the fifth digit\par \par 4 views of the right hand.\par \par COMPARISON: December 2, 2021\par \par Findings/\par impression: Fifth digit soft tissue swelling again noted. Soft tissue lucency at the volar aspect of the tip of the finger. Correlate for laceration. No definite radiographic evidence of osteomyelitis. Mild degenerative changes of the distal interphalangeal joints, first MCP, basal and triscaphe joints.\par \par --- End of Report ---\par \par \par \par \par \par DOMINIQUE VAZQUEZ MD; Attending Radiologist\par This document has been electronically signed. Dec 10 2021  5:51PM\par \par  \par \par  Ordered by: JORGE VALLADARES       Collected/Examined: [id number] 02:47PM       \par Verification Required       Stage: Final       \par  Performed at: [facility]       Resulted: [id number] 05:49PM       Last Updated: [id number] 05:54PM       Accession: [id number]

## 2021-12-30 NOTE — ASSESSMENT
[FreeTextEntry1] : 62 yo F with with right wrist DeQuervain's tendonitis, resolved s/p kenalog (1); and asymptomatic right trigger thumb\par s/p kenalog injection x 2 (right trigger thumb)\par Right wrist stiffness improved\par \par Right 5th felon s/p I&D x 2 in UC and ED.\par persistent swelling and pain\par Recurrent felon, I&D in office today with minimal seropurulent fluid.  REsolving - no OM\par left earlobe tear, well healed\par \par -Rx Medrol dose pk\par -daily Aquaphor BID\par -NSAIDS PRN pain\par -All questions were answered\par -Call for new redness, worsening pain, fevers\par -will schedule left ear tear repair at a later date after felon resolution.  B/R/A reviewed in detail.\par -Follow up 2 weeks for possible work clearance\par \par Due to COVID-19, pre-visit patient instructions were explained to the patient and their symptoms were checked upon arrival. Masks were used by the healthcare provider and staff and the examination room was cleaned after the patient visit concluded\par

## 2022-01-11 ENCOUNTER — APPOINTMENT (OUTPATIENT)
Dept: OBGYN | Facility: CLINIC | Age: 62
End: 2022-01-11

## 2022-02-23 ENCOUNTER — APPOINTMENT (OUTPATIENT)
Dept: PLASTIC SURGERY | Facility: CLINIC | Age: 62
End: 2022-02-23
Payer: COMMERCIAL

## 2022-02-23 DIAGNOSIS — L03.019 CELLULITIS OF UNSPECIFIED FINGER: ICD-10-CM

## 2022-02-23 PROCEDURE — 99212 OFFICE O/P EST SF 10 MIN: CPT

## 2022-02-23 NOTE — ASSESSMENT
[FreeTextEntry1] : 62 yo F with with right wrist DeQuervain's tendonitis, resolved s/p kenalog (1); and asymptomatic right trigger thumb\par s/p kenalog injection x 2 (right trigger thumb)\par Right wrist stiffness improved\par \par Right 5th felon s/p I&D x 2 in UC and ED with persistent swelling and pain\par Recurrent felon, I&D in office 12/10/21 with minimal seropurulent fluid.  REsolving - no OM\par left earlobe tear, well healed\par \par -daily Aquaphor and scar massage to desensitize the area \par -NSAIDS PRN pain\par -All questions were answered\par -May return to work full duty 2/28/22\par -Follow up PRN \par \par Due to COVID-19, pre-visit patient instructions were explained to the patient and their symptoms were checked upon arrival. Masks were used by the healthcare provider and staff and the examination room was cleaned after the patient visit concluded\par

## 2022-02-23 NOTE — PHYSICAL EXAM
[de-identified] : well-appearing, NAD [de-identified] : right UE: normal tenodesis and finger cascade, no guarding of radial wrist and thumb, intact FDS/FDP/extensors, tenderness over CMC, negative grind test, nontender 1st extensor compartment, negative Finkelstein test; no triggering of thumb with nontender palpable volar nodule at A1 pulley\par right wrist with decreased extensor ROM compared to left, \par right 5th digit minimally tender finger pad, hypersensitive, healed I&D site, no erythema,  no drainage, no open wounds, improving edema, [de-identified] : left earlobe tear wound, healed

## 2022-02-23 NOTE — HISTORY OF PRESENT ILLNESS
[FreeTextEntry1] : 59 yo RHD F with 1 year history of right wrist pain of thumb.  She reports that the right thumb cannot be extended or flexed due to pain over the back of thumb/wrist and the palmar side of thumb.  She reports 2 month history of thumb spica splint use with some relief in order to work.  But still has persistent right wrist pain, which has been worsening over 1 year.   Pt was started on nabumetone for wrist pain with some relief.  She reports no prior surgical treatments for current complaint or prior UE surgeries.  She was referred by her Rheumatologist- Dr. Colindres for treatment of suspected DeQuervain's syndrome.  \par \par Denies numbness, redness or swelling in hands/fingers.\par \par Interval hx (7/19/19):  Here for follow up after kenalog injection to right 1st extensor and trigger thumb.  Compliant with right thumb spica splint.  Reports improvement of pain in right wrist and thumb.   thumb still with difficulty flexing.  New c/o right radial wrist decreased sensation for the last 2 weeks.  Denies hand/wrist trauma.\par \par Interval hx (9/6/19): Pt presents for f/u after repeat kenalog injection to right trigger thumb.  patient feels much better since last kenalog injection to right trigger thumb.  She reports thumb spica splint use.  She feels right wrist stiffness.  Denies clicking, popping, wrist pain\par \par Interval hx (12/10/21):  Right small finger pain and swelling over 1 week ago.  She went to  where she was treated with radial I&D for felon (11/1/21), followed by return to [facility] ED 11/2/21 for repeat I&D by hand surgery and discharged with PO keflex x 7 days. She performed hand soaks with soap TID.  Denies fevers, chills, redness.  BCx were negative. no kiran pus noted on repeat I&D.  Pt believes the swelling has improved. \par \par Interval hx (12/17/21): 1 week s/p repeat I&D of right 5th felon. compliant with soapy hand soaks\par \par Interval hx (12/30/21). Pt presents today for f/u 2 weeks s/p repeat I&D of right 5th finger felon c/o persistent pain and swelling, slightly better compared to last visit. Denies any f/c, open wounds or drainage. \par \par Interval hx (2/23/22). Pt presents today for f/u 2 months s/p repeat I&D of right 5th finger felon. Doing well with resolving pain, swelling and discomfort. c/o hypersensitivity at right 5th fingertip. Otherwise doing well. Denies any f/c, open wounds or drainage.

## 2022-08-01 ENCOUNTER — NON-APPOINTMENT (OUTPATIENT)
Age: 62
End: 2022-08-01

## 2022-08-01 ENCOUNTER — APPOINTMENT (OUTPATIENT)
Dept: OBGYN | Facility: CLINIC | Age: 62
End: 2022-08-01

## 2022-08-01 PROCEDURE — 87490 CHLMYD TRACH DNA DIR PROBE: CPT

## 2022-08-01 PROCEDURE — 99396 PREV VISIT EST AGE 40-64: CPT

## 2022-08-09 ENCOUNTER — APPOINTMENT (OUTPATIENT)
Dept: OBGYN | Facility: CLINIC | Age: 62
End: 2022-08-09

## 2022-08-09 PROCEDURE — 76830 TRANSVAGINAL US NON-OB: CPT

## 2022-11-17 ENCOUNTER — EMERGENCY (EMERGENCY)
Facility: HOSPITAL | Age: 62
LOS: 0 days | Discharge: HOME | End: 2022-11-17
Attending: EMERGENCY MEDICINE | Admitting: EMERGENCY MEDICINE

## 2022-11-17 VITALS
DIASTOLIC BLOOD PRESSURE: 90 MMHG | OXYGEN SATURATION: 99 % | RESPIRATION RATE: 18 BRPM | SYSTOLIC BLOOD PRESSURE: 153 MMHG | TEMPERATURE: 97 F | HEART RATE: 78 BPM

## 2022-11-17 DIAGNOSIS — M54.50 LOW BACK PAIN, UNSPECIFIED: ICD-10-CM
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
## 2022-11-17 DIAGNOSIS — S90.32XA CONTUSION OF LEFT FOOT, INITIAL ENCOUNTER: ICD-10-CM
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
## 2022-11-17 PROCEDURE — 70450 CT HEAD/BRAIN W/O DYE: CPT | Mod: 26,MA

## 2022-11-17 PROCEDURE — 73590 X-RAY EXAM OF LOWER LEG: CPT | Mod: 26,LT

## 2022-11-17 PROCEDURE — 73562 X-RAY EXAM OF KNEE 3: CPT | Mod: 26,LT

## 2022-11-17 PROCEDURE — 73610 X-RAY EXAM OF ANKLE: CPT | Mod: 26,LT

## 2022-11-17 PROCEDURE — 73630 X-RAY EXAM OF FOOT: CPT | Mod: 26,LT

## 2022-11-17 PROCEDURE — 99285 EMERGENCY DEPT VISIT HI MDM: CPT

## 2022-11-17 RX ORDER — ACETAMINOPHEN 500 MG
650 TABLET ORAL ONCE
Refills: 0 | Status: COMPLETED | OUTPATIENT
Start: 2022-11-17 | End: 2022-11-17

## 2022-11-17 RX ADMIN — Medication 650 MILLIGRAM(S): at 17:23

## 2022-11-17 NOTE — ED PROVIDER NOTE - CLINICAL SUMMARY MEDICAL DECISION MAKING FREE TEXT BOX
pt presenting for eval sp [facility] fall last night. +HI no LOC no AC. also injured LLE. ambulatory. no numbness/focal weakness. no other injuries or acute complaints. Well appearing, NAD, non toxic. NCAT PERRLA EOMI neck supple non tender normal wob cta bl rrr abdomen s nt nd no rebound no guarding WWPx4 neuro non focal 2+ equal pulses throughout <2sec capillary refill throughout, +ecchymosis to L foot/ankle, distal tib/fib. compartments soft. no open wounds. ttp to site. imaging reviewed. Comfortable with discharge and follow-up outpatient, strict return precautions given. Endorses understanding of all of this and aware that they can return at any time for new or concerning symptoms. No further questions or concerns at this time

## 2022-11-17 NOTE — ED PROVIDER NOTE - NS ED ATTENDING STATEMENT MOD
This was a shared visit with the CANDELARIO. I reviewed and verified the documentation and independently performed the documented:

## 2022-11-17 NOTE — ED PROVIDER NOTE - WR ORDER STATUS 2
Pt saw cardiologist on 5/13 and states he needs a NM Courtney stress test. Surgery canceled. Pt to call back to schedule once testing done. Performed

## 2022-11-17 NOTE — ED ADULT NURSE NOTE - CHIEF COMPLAINT QUOTE
Pt fell backwards yesterday. + head injury. Denies LOC. GCS 15. Not on AC. C/o head, back, and bilateral leg pain

## 2022-11-17 NOTE — ED PROVIDER NOTE - CARE PROVIDER_API CALL
Marita Rutherford  Internal Medicine  [address] [place] [address]  Phone: [phone]  Fax: [phone]  Follow Up Time: 1-3 Days

## 2022-11-17 NOTE — ED PROVIDER NOTE - OBJECTIVE STATEMENT
62 year old female, no past medical history, who presents s/p fall that occurred yesterday. 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 patient hit L foot/L leg and head, no loc, no ac use. patient was able to stand and ambulate thereafter. denies fever, chills, vision changes, neck pain, abd pain, nausea/vomiting/diarrhea, urinary symptoms, syncope.

## 2022-11-17 NOTE — ED PROVIDER NOTE - PHYSICAL EXAMINATION
CONSTITUTIONAL: Well-developed; well-nourished; in no acute distress, nontoxic appearing  SKIN: skin exam is warm and dry,  HEAD: Normocephalic; atraumatic.  EYES: PERRL, 3 mm bilateral, no nystagmus, EOM intact; conjunctiva and sclera clear.  NECK: No c-spine tenderness. ROM intact.  CARD: S1, S2 normal, no murmur  RESP: No wheezes, rales or rhonchi. Good air movement bilaterally  ABD: soft; non-distended; non-tender.    EXT: LLE: +TTP overlying dorsum of L foot, no deformity, FROM, pulses 2+   NEURO: awake, alert, following commands, oriented, grossly unremarkable. No Focal deficits. GCS 15.   PSYCH: Cooperative, appropriate.

## 2022-11-17 NOTE — ED PROVIDER NOTE - NSFOLLOWUPINSTRUCTIONS_ED_ALL_ED_FT
Foot Pain   Many things can cause foot pain. Some common causes are:  An injury.A sprain.Arthritis.Blisters.Bunions.Follow these instructions at home:  Pay attention to any changes in your symptoms. Take these actions to help with your discomfort:  If directed, put ice on the affected area:  Put ice in a plastic bag.Place a towel between your skin and the bag.Leave the ice on for 15–20 minutes, 3?4 times a day for 2 days.Take over-the-counter and prescription medicines only as told by your health care provider.Wear comfortable, supportive shoes that fit you well. Do not wear high heels.Do not stand or walk for long periods of time.Do not lift a lot of weight. This can put added pressure on your feet.Do stretches to relieve foot pain and stiffness as told by your health care provider.Rub your foot gently.Keep your feet clean and dry.Contact a health care provider if:  Your pain does not get better after a few days of self-care.Your pain gets worse.You cannot stand on your foot.Get help right away if:  Your foot is numb or tingling.Your foot or toes are swollen.Your foot or toes turn white or blue.You have warmth and redness along your foot.This information is not intended to replace advice given to you by your health care provider. Make sure you discuss any questions you have with your health care provider.    Document Released: 01/13/2017 Document Revised: 05/25/2017 Document Reviewed: 01/13/2016  [employer] Patient Education © 2019 [employer]    Closed Head Injury    Closed head injury in an injury to your head that may or may not involve a traumatic brain injury (TBI). Symptoms of TBI can be short or long lasting and include headache, dizziness, interference with memory or speech, fatigue, confusion, changes in sleep, mood changes, nausea, depression/anxiety, and dulling of senses. Make sure to obtain proper rest which includes getting plenty of sleep, avoiding excessive visual stimulation, and avoiding activities that may cause physical or mental stress. Avoid any situation where there is potential for another head injury including sports.    SEEK MEDICAL CARE IF YOU HAVE THE FOLLOWING SYMPTOMS: unusual drowsiness, vomiting, severe dizziness, seizures, lightheadedness, muscular weakness, different pupil sizes, visual changes, or clear or bloody discharge from your ears or nose.      Fall Prevention in the Home    Falls can cause injuries and can affect people from all age groups. There are many simple things that you can do to make your home safe and to help prevent falls.    WHAT CAN I DO ON THE OUTSIDE OF MY HOME?  Regularly repair the edges of walkways and driveways and fix any cracks.  Remove high doorway thresholds.  Trim any shrubbery on the main path into your home.  Use bright outdoor lighting.  Clear walkways of debris and clutter, including tools and rocks.  Regularly check that handrails are securely fastened and in good repair. Both sides of any steps should have handrails.  Install guardrails along the edges of any raised decks or porches.  Have leaves, snow, and ice cleared regularly.  Use sand or salt on walkways during winter months.  In the garage, clean up any spills right away, including grease or oil spills.    WHAT CAN I DO IN THE BATHROOM?  Use night lights.  Install grab bars by the toilet and in the tub and shower. Do not use towel bars as grab bars.  Use non-skid mats or decals on the floor of the tub or shower.  If you need to sit down while you are in the shower, use a plastic, non-slip stool.  Keep the floor dry. Immediately clean up any water that spills on the floor.  Remove soap buildup in the tub or shower on a regular basis.  Attach bath mats securely with double-sided non-slip rug tape.  Remove throw rugs and other tripping hazards from the floor.    WHAT CAN I DO IN THE BEDROOM?  Use night lights.  Make sure that a bedside light is easy to reach.  Do not use oversized bedding that drapes onto the floor.  Have a firm chair that has side arms to use for getting dressed.  Remove throw rugs and other tripping hazards from the floor.    WHAT CAN I DO IN THE KITCHEN?  Clean up any spills right away.  Avoid walking on wet floors.  Place frequently used items in easy-to-reach places.  If you need to reach for something above you, use a sturdy step stool that has a grab bar.  Keep electrical cables out of the way.  Do not use floor polish or wax that makes floors slippery. If you have to use wax, make sure that it is non-skid floor wax.  Remove throw rugs and other tripping hazards from the floor.    WHAT CAN I DO IN THE STAIRWAYS?  Do not leave any items on the stairs.  Make sure that there are handrails on both sides of the stairs. Fix handrails that are broken or loose. Make sure that handrails are as long as the stairways.  Check any carpeting to make sure that it is firmly attached to the stairs. Fix any carpet that is loose or worn.  Avoid having throw rugs at the top or bottom of stairways, or secure the rugs with carpet tape to prevent them from moving.  Make sure that you have a light switch at the top of the stairs and the bottom of the stairs. If you do not have them, have them installed.    WHAT ARE SOME OTHER FALL PREVENTION TIPS?  Wear closed-toe shoes that fit well and support your feet. Wear shoes that have rubber soles or low heels.  When you use a stepladder, make sure that it is completely opened and that the sides are firmly locked. Have someone hold the ladder while you are using it. Do not climb a closed stepladder.  Add color or contrast paint or tape to grab bars and handrails in your home. Place contrasting color strips on the first and last steps.  Use mobility aids as needed, such as canes, walkers, scooters, and crutches.  Turn on lights if it is dark. Replace any light bulbs that burn out.  Set up furniture so that there are clear paths. Keep the furniture in the same spot.  Fix any uneven floor surfaces.  Choose a carpet design that does not hide the edge of steps of a stairway.  Be aware of any and all pets.  Review your medicines with your healthcare provider. Some medicines can cause dizziness or changes in blood pressure, which increase your risk of falling.     Talk with your health care provider about other ways that you can decrease your risk of falls. This may include working with a physical therapist or  to improve your strength, balance, and endurance.    ADDITIONAL NOTES AND INSTRUCTIONS    Please follow up with your Primary MD in 24-48 hr.  Seek immediate medical care for any new/worsening signs or symptoms.

## 2022-11-17 NOTE — ED PROVIDER NOTE - NS ED ROS FT
Review of Systems:  	•	CONSTITUTIONAL: no fever  	•	SKIN: no rash   	•	EYES: no eye pain, no blurry vision  	•	ENT: no change in hearing, no tinnitus   	•	RESPIRATORY: no shortness of breath, no cough  	•	CARDIAC: no chest pain, no palpitations  	•	GI: no abd pain, no nausea, no vomiting, no diarrhea   	•	GENITO-URINARY: no discharge, no dysuria; no hematuria, no increased urinary frequency  	•	MUSCULOSKELETAL: +L foot pain, +L leg pain, no joint swelling   	•	NEUROLOGIC: no weakness, no syncope   	•	PSYCH: no anxiety, non suicidal, non homicidal, no hallucination, no depression

## 2023-01-09 NOTE — PATIENT PROFILE ADULT - BILL PAYMENT
no Oculoplastic Surgeon Procedure Text (A): After obtaining clear surgical margins the patient was sent to oculoplastics for surgical repair.  The patient understands they will receive post-surgical care and follow-up from the referring physician's office.

## 2023-02-01 NOTE — ED PROVIDER NOTE - PATIENT PORTAL LINK FT
Winifred Duenas MD  P [facility] Pre Service Ophthalmology Surg [facility]  Diagnosis:   Procedure/CPT:Cataract 95214  Left   Stop Medication for 0 days   Assist needed No   Anesthesia : Local MAC   Time: 30 minutes   Equipment: Intracameral Epi   Dilation within 90 days : Yes      You can access the FollowMyHealth Patient Portal offered by [facility] by registering at the following website: [email or web address]. By joining [employer]’s FollowMyHealth portal, you will also be able to view your health information using other applications (apps) compatible with our system.

## 2023-02-16 ENCOUNTER — APPOINTMENT (OUTPATIENT)
Dept: OBGYN | Facility: CLINIC | Age: 63
End: 2023-02-16
Payer: COMMERCIAL

## 2023-02-16 DIAGNOSIS — R10.2 PELVIC AND PERINEAL PAIN: ICD-10-CM

## 2023-02-16 PROCEDURE — 99213 OFFICE O/P EST LOW 20 MIN: CPT

## 2023-02-16 NOTE — HISTORY OF PRESENT ILLNESS
[FreeTextEntry1] : --63Y/O HERE FOR CHECK UP/PAP.PT C/O CRAMPS ON AND OFF;-NVFC.\par  .HX OF LGSIL \par HX OF FIBROID UTERUS.\par PMHX;/\par PSHX;/\par SOCIAL HX;-ETOH -CIGG \par STD;   HPV /        DISCUSSED CONDOMS\par FAMILY HISTORY OF BREAST CANCER;NO\par REVIEW OF SYMPTOMS DONE;\par ALLERGIES; pt answered NKDA\par Medication reconciliation was completed by reviewing, with the patient's\par involvement, the patient's current outpatient medications and those \par ordered for the patient today. \par \par PE;\par ABDOMEN;SOFT NT ND\par NL GENITALIA\par VAGINA -DC\par CERVIX;-CMT\par UTERUS; TOP NL SIZE NON  TENDER AV\par ADNEXA; NT - MASSES\par \par A;P;MILD CRAMPS, FIBROID UTERUS\par -PAP GC CHLAMYDIA\par -F-U PRN.

## 2023-06-14 ENCOUNTER — EMERGENCY (EMERGENCY)
Facility: HOSPITAL | Age: 63
LOS: 0 days | Discharge: HOME | End: 2023-06-14
Attending: STUDENT IN AN ORGANIZED HEALTH CARE EDUCATION/TRAINING PROGRAM
Payer: COMMERCIAL

## 2023-06-14 VITALS
OXYGEN SATURATION: 98 % | TEMPERATURE: 98 F | SYSTOLIC BLOOD PRESSURE: 132 MMHG | DIASTOLIC BLOOD PRESSURE: 72 MMHG | WEIGHT: 184.09 LBS | HEART RATE: 81 BPM | HEIGHT: 68 IN | RESPIRATION RATE: 18 BRPM

## 2023-06-14 DIAGNOSIS — G89.29 OTHER CHRONIC PAIN: ICD-10-CM

## 2023-06-14 DIAGNOSIS — M54.9 DORSALGIA, UNSPECIFIED: ICD-10-CM

## 2023-06-14 DIAGNOSIS — M54.50 LOW BACK PAIN, UNSPECIFIED: ICD-10-CM

## 2023-06-14 PROCEDURE — 99284 EMERGENCY DEPT VISIT MOD MDM: CPT

## 2023-06-14 PROCEDURE — 96372 THER/PROPH/DIAG INJ SC/IM: CPT

## 2023-06-14 PROCEDURE — 99283 EMERGENCY DEPT VISIT LOW MDM: CPT | Mod: 25

## 2023-06-14 RX ORDER — DEXAMETHASONE 0.5 MG/5ML
10 ELIXIR ORAL ONCE
Refills: 0 | Status: COMPLETED | OUTPATIENT
Start: 2023-06-14 | End: 2023-06-14

## 2023-06-14 RX ORDER — METHOCARBAMOL 500 MG/1
1500 TABLET, FILM COATED ORAL ONCE
Refills: 0 | Status: COMPLETED | OUTPATIENT
Start: 2023-06-14 | End: 2023-06-14

## 2023-06-14 RX ORDER — METHOCARBAMOL 500 MG/1
2 TABLET, FILM COATED ORAL
Qty: 24 | Refills: 0
Start: 2023-06-14 | End: 2023-06-17

## 2023-06-14 RX ORDER — KETOROLAC TROMETHAMINE 30 MG/ML
30 SYRINGE (ML) INJECTION ONCE
Refills: 0 | Status: DISCONTINUED | OUTPATIENT
Start: 2023-06-14 | End: 2023-06-14

## 2023-06-14 RX ADMIN — METHOCARBAMOL 1500 MILLIGRAM(S): 500 TABLET, FILM COATED ORAL at 14:59

## 2023-06-14 RX ADMIN — Medication 30 MILLIGRAM(S): at 14:59

## 2023-06-14 RX ADMIN — Medication 10 MILLIGRAM(S): at 14:59

## 2023-06-14 NOTE — ED PROVIDER NOTE - CLINICAL SUMMARY MEDICAL DECISION MAKING FREE TEXT BOX
62-year-old female history of low back pain follows up with PMD who recently gave her a shot into her buttock on Wednesday is presenting here with pain that reoccurred no trauma no fevers no chills no urinary or fecal incontinence no saddle anesthesia pain is from her lower back goes down to her posterior knee on the left vs reviewed meds given. Patient instructed to follow up with pt/rehab. Strict return precautions provided

## 2023-06-14 NOTE — ED PROVIDER NOTE - OBJECTIVE STATEMENT
62y F pmh chronic back pain presents for eval of back pain. Pt has intermittent mild/moderate aching lower back pain radiating down her R leg, improved with naproxen, aggravated with sitting. Denies numbness, weakness, incontinence, fever, cp, sob, n/v/d/c

## 2023-06-14 NOTE — ED PROVIDER NOTE - ATTENDING APP SHARED VISIT CONTRIBUTION OF CARE
62-year-old female history of low back pain follows up with PMD who recently gave her a shot into her buttock on Wednesday is presenting here with pain that reoccurred no trauma no fevers no chills no urinary or fecal incontinence no saddle anesthesia pain is from her lower back goes down to her posterior knee on the left   CONSTITUTIONAL: WA / WN / NAD  HEAD: NCAT  EYES: PERRL; EOMI;   MSK/EXT: No gross + ttp left psis joint   SKIN: Warm and dry;   NEURO: AAOx3, Motor 5/5 x 4 extremities  PSYCH: Memory Intact, Normal Affect

## 2023-06-14 NOTE — ED PROVIDER NOTE - PATIENT PORTAL LINK FT
You can access the FollowMyHealth Patient Portal offered by [facility] by registering at the following website: [email or web address]. By joining [employer]’s FollowMyHealth portal, you will also be able to view your health information using other applications (apps) compatible with our system.

## 2023-06-14 NOTE — ED PROVIDER NOTE - NSFOLLOWUPCLINICS_GEN_ALL_ED_FT
[facility]  Physical Therapy  Multiple Location  [place]   Phone: [phone]  Fax:

## 2023-08-24 ENCOUNTER — APPOINTMENT (OUTPATIENT)
Dept: OBGYN | Facility: CLINIC | Age: 63
End: 2023-08-24
Payer: COMMERCIAL

## 2023-08-24 ENCOUNTER — LABORATORY RESULT (OUTPATIENT)
Age: 63
End: 2023-08-24

## 2023-08-24 ENCOUNTER — NON-APPOINTMENT (OUTPATIENT)
Age: 63
End: 2023-08-24

## 2023-08-24 DIAGNOSIS — Z01.419 ENCOUNTER FOR GYNECOLOGICAL EXAMINATION (GENERAL) (ROUTINE) W/OUT ABNORMAL FINDINGS: ICD-10-CM

## 2023-08-24 PROCEDURE — 99396 PREV VISIT EST AGE 40-64: CPT

## 2023-08-24 NOTE — HISTORY OF PRESENT ILLNESS
[FreeTextEntry1] : --61Y/O HERE FOR CHECK UP/PAP.  .HX OF LGSIL  HX OF FIBROID UTERUS. PMHX;/ PSHX;/ SOCIAL HX;-ETOH -CIGG  STD;   HPV /        DISCUSSED CONDOMS FAMILY HISTORY OF BREAST CANCER;NO REVIEW OF SYMPTOMS DONE; ALLERGIES; pt answered NKDA Medication reconciliation was completed by reviewing, with the patient's involvement, the patient's current outpatient medications and those  ordered for the patient today.   PE;BREASTS;-MASSES DC NODES SBE ABDOMEN;SOFT NT ND NL GENITALIA VAGINA -DC CERVIX;-CMT UTERUS; TOP NL SIZE MILD  TENDER AV ADNEXA; NT - MASSES  A;P;CHECK UP, PELVIC PAIN.,  -PAP  -SONO -MITCH -F-U PRN.

## 2023-08-28 ENCOUNTER — APPOINTMENT (OUTPATIENT)
Dept: OBGYN | Facility: CLINIC | Age: 63
End: 2023-08-28
Payer: COMMERCIAL

## 2023-08-28 DIAGNOSIS — D21.9 BENIGN NEOPLASM OF CONNECTIVE AND OTHER SOFT TISSUE, UNSPECIFIED: ICD-10-CM

## 2023-08-28 LAB — HPV HIGH+LOW RISK DNA PNL CVX: DETECTED

## 2023-08-28 PROCEDURE — 99213 OFFICE O/P EST LOW 20 MIN: CPT

## 2023-08-28 PROCEDURE — 76830 TRANSVAGINAL US NON-OB: CPT

## 2023-08-28 NOTE — HISTORY OF PRESENT ILLNESS
[FreeTextEntry1] : --61Y/O HERE FOR F-U;SONO REVIEWED;FIBROID UTERUS  .HX OF LGSIL  HX OF FIBROID UTERUS. PMHX;/ PSHX;/ SOCIAL HX;-ETOH -CIGG  STD;   HPV /        DISCUSSED CONDOMS FAMILY HISTORY OF BREAST CANCER;NO REVIEW OF SYMPTOMS DONE; ALLERGIES; pt answered NKDA Medication reconciliation was completed by reviewing, with the patient's involvement, the patient's current outpatient medications and those  ordered for the patient today.   PE; ABDOMEN;SOFT NT ND EXT -CCE  A;P;FIBROID UTERUS -SONO REVIEWED -F-U PRN.

## 2023-08-29 LAB — CYTOLOGY CVX/VAG DOC THIN PREP: ABNORMAL

## 2023-08-30 ENCOUNTER — EMERGENCY (EMERGENCY)
Facility: HOSPITAL | Age: 63
LOS: 0 days | Discharge: HOME | End: 2023-08-30
Attending: EMERGENCY MEDICINE
Payer: COMMERCIAL

## 2023-08-30 VITALS
HEART RATE: 95 BPM | RESPIRATION RATE: 20 BRPM | SYSTOLIC BLOOD PRESSURE: 130 MMHG | DIASTOLIC BLOOD PRESSURE: 76 MMHG | OXYGEN SATURATION: 99 % | TEMPERATURE: 98 F | WEIGHT: 186.07 LBS

## 2023-08-30 DIAGNOSIS — R53.1 WEAKNESS: ICD-10-CM

## 2023-08-30 DIAGNOSIS — M79.10 MYALGIA, UNSPECIFIED SITE: ICD-10-CM

## 2023-08-30 DIAGNOSIS — B34.9 VIRAL INFECTION, UNSPECIFIED: ICD-10-CM

## 2023-08-30 DIAGNOSIS — R51.9 HEADACHE, UNSPECIFIED: ICD-10-CM

## 2023-08-30 DIAGNOSIS — Z20.822 CONTACT WITH AND (SUSPECTED) EXPOSURE TO COVID-19: ICD-10-CM

## 2023-08-30 LAB
FLUAV AG NPH QL: SIGNIFICANT CHANGE UP
FLUBV AG NPH QL: SIGNIFICANT CHANGE UP
RSV RNA NPH QL NAA+NON-PROBE: SIGNIFICANT CHANGE UP
SARS-COV-2 RNA SPEC QL NAA+PROBE: SIGNIFICANT CHANGE UP

## 2023-08-30 PROCEDURE — 0241U: CPT

## 2023-08-30 PROCEDURE — 99284 EMERGENCY DEPT VISIT MOD MDM: CPT

## 2023-08-30 PROCEDURE — 99283 EMERGENCY DEPT VISIT LOW MDM: CPT

## 2023-08-30 RX ORDER — IBUPROFEN 200 MG
600 TABLET ORAL ONCE
Refills: 0 | Status: COMPLETED | OUTPATIENT
Start: 2023-08-30 | End: 2023-08-30

## 2023-08-30 RX ADMIN — Medication 600 MILLIGRAM(S): at 17:37

## 2023-08-30 NOTE — ED ADULT NURSE NOTE - NSFALLUNIVINTERV_ED_ALL_ED
Bed/Stretcher in lowest position, wheels locked, appropriate side rails in place/Call bell, personal items and telephone in reach/Instruct patient to call for assistance before getting out of bed/chair/stretcher/Non-slip footwear applied when patient is off stretcher/[place] to call system/Physically safe environment - no spills, clutter or unnecessary equipment/Purposeful proactive rounding/Room/bathroom lighting operational, light cord in reach

## 2023-08-30 NOTE — ED PROVIDER NOTE - PHYSICAL EXAMINATION
CONST: Fatigued appearing in NAD  EYES: PERRL, EOMI, Sclera and conjunctiva clear.   ENT: No nasal discharge. TM's clear B/L without drainage. Oropharynx normal appearing, no erythema or exudates. Uvula midline.  NECK: Non-tender, no meningeal signs  CARD: Normal S1 S2; Normal rate and rhythm  RESP: Equal BS B/L, No wheezes, rhonchi or rales. No distress  GI: Soft, non-tender, non-distended.  MS: Normal ROM in all extremities. No midline spinal tenderness.  SKIN: Warm, dry, no acute rashes. Good turgor  NEURO: A&Ox3, No focal deficits. Strength 5/5 with no sensory deficits. Steady gait

## 2023-08-30 NOTE — ED PROVIDER NOTE - CLINICAL SUMMARY MEDICAL DECISION MAKING FREE TEXT BOX
62yF p/w myalgias and URI/cough.  Pt well appearing, afebrile, hemodynamically stable and w/o resp distress.  No concern for PE.  RVP sent.  Pt offered NSAIDs for myalgias.  Recommend supportive care, o/p f/u, return precautions.

## 2023-08-30 NOTE — ED PROVIDER NOTE - ATTENDING APP SHARED VISIT CONTRIBUTION OF CARE
62yF otherwise healthy p/w headache, myalgias and nasal congestion x 1-2d with dry cough.  No fever.  No SOB or CP.  No known sick contacts.

## 2023-08-30 NOTE — ED ADULT NURSE NOTE - OBJECTIVE STATEMENT
pt presented to ed complaining of weakness and cough. pt is ao, no signs of sob, no signs of labored breathing. pt denies chest pain.

## 2023-08-30 NOTE — ED ADULT NURSE NOTE - NS ED NURSE DISCH DISPOSITION
Include Z78.9 (Other Specified Conditions Influencing Health Status) As An Associated Diagnosis?: Yes Detail Level: Detailed Render Post-Care Instructions In Note?: no Medical Necessity Clause: This procedure was medically necessary because the lesions that were treated were: Consent: The patient's consent was obtained including but not limited to risks of crusting, scabbing, blistering, scarring, darker or lighter pigmentary change, recurrence, incomplete removal and infection. Medical Necessity Information: It is in your best interest to select a reason for this procedure from the list below. All of these items fulfill various CMS LCD requirements except the new and changing color options. Post-Care Instructions: I reviewed with the patient in detail post-care instructions. Patient is to wear sunprotection, and avoid picking at any of the treated lesions. Pt may apply Vaseline to crusted or scabbing areas. Size Of Lesion In Cm (Optional): 0 Discharged

## 2023-08-30 NOTE — ED PROVIDER NOTE - PATIENT PORTAL LINK FT
You can access the FollowMyHealth Patient Portal offered by [facility] by registering at the following website: [email or web address]. By joining [facility]’s FollowMyHealth portal, you will also be able to view your health information using other applications (apps) compatible with our system.

## 2023-08-30 NOTE — ED ADULT TRIAGE NOTE - PAIN RATING/NUMBER SCALE (0-10): ACTIVITY
Preliminary urine culture positive. Results sent to physician to review. Patient prescribed Ceftin. 5 (moderate pain)

## 2023-08-30 NOTE — ED PROVIDER NOTE - OBJECTIVE STATEMENT
63yo female with no significant PMHx presents c/o HA, generalized bodyaches x 2-3 days and weakness since this morning. Pt additionally notes dry intermittent cough of which she has been taking cough medicine without relief. No specific aggravating or relieving factors. Pt has not tried any OTC meds. Denies fever, chills, SOB, CP, abdominal pain. Notes she has been "overcome with grief" as she recently buried a sister in [place] two weeks, returned home on 8/17/23. She is without leg swelling or CP.

## 2023-09-01 ENCOUNTER — NON-APPOINTMENT (OUTPATIENT)
Age: 63
End: 2023-09-01

## 2023-09-04 ENCOUNTER — INPATIENT (INPATIENT)
Facility: HOSPITAL | Age: 63
LOS: 0 days | Discharge: HOME | DRG: 198 | End: 2023-09-05
Attending: INTERNAL MEDICINE | Admitting: STUDENT IN AN ORGANIZED HEALTH CARE EDUCATION/TRAINING PROGRAM
Payer: COMMERCIAL

## 2023-09-04 VITALS
SYSTOLIC BLOOD PRESSURE: 143 MMHG | TEMPERATURE: 99 F | DIASTOLIC BLOOD PRESSURE: 78 MMHG | OXYGEN SATURATION: 96 % | HEART RATE: 86 BPM | RESPIRATION RATE: 17 BRPM

## 2023-09-04 DIAGNOSIS — J18.9 PNEUMONIA, UNSPECIFIED ORGANISM: ICD-10-CM

## 2023-09-04 LAB
ALBUMIN SERPL ELPH-MCNC: 3.6 G/DL — SIGNIFICANT CHANGE UP (ref 3.5–5.2)
ALP SERPL-CCNC: 41 U/L — SIGNIFICANT CHANGE UP (ref 30–115)
ALT FLD-CCNC: 17 U/L — SIGNIFICANT CHANGE UP (ref 0–41)
ANION GAP SERPL CALC-SCNC: 8 MMOL/L — SIGNIFICANT CHANGE UP (ref 7–14)
AST SERPL-CCNC: 36 U/L — SIGNIFICANT CHANGE UP (ref 0–41)
BASOPHILS # BLD AUTO: 0 K/UL — SIGNIFICANT CHANGE UP (ref 0–0.2)
BASOPHILS NFR BLD AUTO: 0 % — SIGNIFICANT CHANGE UP (ref 0–1)
BILIRUB SERPL-MCNC: <0.2 MG/DL — SIGNIFICANT CHANGE UP (ref 0.2–1.2)
BUN SERPL-MCNC: 11 MG/DL — SIGNIFICANT CHANGE UP (ref 10–20)
BURR CELLS BLD QL SMEAR: PRESENT — SIGNIFICANT CHANGE UP
CALCIUM SERPL-MCNC: 9.3 MG/DL — SIGNIFICANT CHANGE UP (ref 8.4–10.4)
CHLORIDE SERPL-SCNC: 106 MMOL/L — SIGNIFICANT CHANGE UP (ref 98–110)
CO2 SERPL-SCNC: 25 MMOL/L — SIGNIFICANT CHANGE UP (ref 17–32)
CREAT SERPL-MCNC: 0.8 MG/DL — SIGNIFICANT CHANGE UP (ref 0.7–1.5)
DACRYOCYTES BLD QL SMEAR: SLIGHT — SIGNIFICANT CHANGE UP
EGFR: 84 ML/MIN/1.73M2 — SIGNIFICANT CHANGE UP
EOSINOPHIL # BLD AUTO: 0.04 K/UL — SIGNIFICANT CHANGE UP (ref 0–0.7)
EOSINOPHIL NFR BLD AUTO: 1.6 % — SIGNIFICANT CHANGE UP (ref 0–8)
GLUCOSE SERPL-MCNC: 83 MG/DL — SIGNIFICANT CHANGE UP (ref 70–99)
HCT VFR BLD CALC: 40.8 % — SIGNIFICANT CHANGE UP (ref 37–47)
HGB BLD-MCNC: 12.9 G/DL — SIGNIFICANT CHANGE UP (ref 12–16)
LACTATE SERPL-SCNC: 0.9 MMOL/L — SIGNIFICANT CHANGE UP (ref 0.7–2)
LYMPHOCYTES # BLD AUTO: 0.27 K/UL — LOW (ref 1.2–3.4)
LYMPHOCYTES # BLD AUTO: 11.7 % — LOW (ref 20.5–51.1)
MANUAL SMEAR VERIFICATION: SIGNIFICANT CHANGE UP
MCHC RBC-ENTMCNC: 27.5 PG — SIGNIFICANT CHANGE UP (ref 27–31)
MCHC RBC-ENTMCNC: 31.6 G/DL — LOW (ref 32–37)
MCV RBC AUTO: 87 FL — SIGNIFICANT CHANGE UP (ref 81–99)
MONOCYTES # BLD AUTO: 0.16 K/UL — SIGNIFICANT CHANGE UP (ref 0.1–0.6)
MONOCYTES NFR BLD AUTO: 6.7 % — SIGNIFICANT CHANGE UP (ref 1.7–9.3)
NEUTROPHILS # BLD AUTO: 1.83 K/UL — SIGNIFICANT CHANGE UP (ref 1.4–6.5)
NEUTROPHILS NFR BLD AUTO: 78.3 % — HIGH (ref 42.2–75.2)
NT-PROBNP SERPL-SCNC: 22 PG/ML — SIGNIFICANT CHANGE UP (ref 0–300)
OVALOCYTES BLD QL SMEAR: SLIGHT — SIGNIFICANT CHANGE UP
PLAT MORPH BLD: NORMAL — SIGNIFICANT CHANGE UP
PLATELET # BLD AUTO: 142 K/UL — SIGNIFICANT CHANGE UP (ref 130–400)
PMV BLD: 11.1 FL — HIGH (ref 7.4–10.4)
POIKILOCYTOSIS BLD QL AUTO: SLIGHT — SIGNIFICANT CHANGE UP
POTASSIUM SERPL-MCNC: 5.3 MMOL/L — HIGH (ref 3.5–5)
POTASSIUM SERPL-SCNC: 5.3 MMOL/L — HIGH (ref 3.5–5)
PROT SERPL-MCNC: 9 G/DL — HIGH (ref 6–8)
RBC # BLD: 4.69 M/UL — SIGNIFICANT CHANGE UP (ref 4.2–5.4)
RBC # FLD: 14.1 % — SIGNIFICANT CHANGE UP (ref 11.5–14.5)
RBC BLD AUTO: ABNORMAL
SMUDGE CELLS # BLD: PRESENT — SIGNIFICANT CHANGE UP
SODIUM SERPL-SCNC: 139 MMOL/L — SIGNIFICANT CHANGE UP (ref 135–146)
TROPONIN T SERPL-MCNC: <0.01 NG/ML — SIGNIFICANT CHANGE UP
VARIANT LYMPHS # BLD: 1.7 % — SIGNIFICANT CHANGE UP (ref 0–5)
WBC # BLD: 2.34 K/UL — LOW (ref 4.8–10.8)
WBC # FLD AUTO: 2.34 K/UL — LOW (ref 4.8–10.8)

## 2023-09-04 PROCEDURE — 93010 ELECTROCARDIOGRAM REPORT: CPT

## 2023-09-04 PROCEDURE — 83735 ASSAY OF MAGNESIUM: CPT

## 2023-09-04 PROCEDURE — 36415 COLL VENOUS BLD VENIPUNCTURE: CPT

## 2023-09-04 PROCEDURE — 85025 COMPLETE CBC W/AUTO DIFF WBC: CPT

## 2023-09-04 PROCEDURE — 86803 HEPATITIS C AB TEST: CPT

## 2023-09-04 PROCEDURE — 84145 PROCALCITONIN (PCT): CPT

## 2023-09-04 PROCEDURE — 99285 EMERGENCY DEPT VISIT HI MDM: CPT

## 2023-09-04 PROCEDURE — 80053 COMPREHEN METABOLIC PANEL: CPT

## 2023-09-04 PROCEDURE — 94640 AIRWAY INHALATION TREATMENT: CPT

## 2023-09-04 PROCEDURE — 99223 1ST HOSP IP/OBS HIGH 75: CPT

## 2023-09-04 PROCEDURE — 85379 FIBRIN DEGRADATION QUANT: CPT

## 2023-09-04 PROCEDURE — 71046 X-RAY EXAM CHEST 2 VIEWS: CPT | Mod: 26

## 2023-09-04 PROCEDURE — 87040 BLOOD CULTURE FOR BACTERIA: CPT

## 2023-09-04 RX ORDER — SODIUM CHLORIDE 9 MG/ML
1000 INJECTION INTRAMUSCULAR; INTRAVENOUS; SUBCUTANEOUS ONCE
Refills: 0 | Status: COMPLETED | OUTPATIENT
Start: 2023-09-04 | End: 2023-09-04

## 2023-09-04 RX ORDER — AZITHROMYCIN 500 MG/1
500 TABLET, FILM COATED ORAL ONCE
Refills: 0 | Status: COMPLETED | OUTPATIENT
Start: 2023-09-04 | End: 2023-09-04

## 2023-09-04 RX ORDER — IPRATROPIUM/ALBUTEROL SULFATE 18-103MCG
3 AEROSOL WITH ADAPTER (GRAM) INHALATION ONCE
Refills: 0 | Status: COMPLETED | OUTPATIENT
Start: 2023-09-04 | End: 2023-09-04

## 2023-09-04 RX ORDER — CEFTRIAXONE 500 MG/1
1000 INJECTION, POWDER, FOR SOLUTION INTRAMUSCULAR; INTRAVENOUS ONCE
Refills: 0 | Status: COMPLETED | OUTPATIENT
Start: 2023-09-04 | End: 2023-09-04

## 2023-09-04 RX ORDER — DEXAMETHASONE 0.5 MG/5ML
6 ELIXIR ORAL ONCE
Refills: 0 | Status: COMPLETED | OUTPATIENT
Start: 2023-09-04 | End: 2023-09-04

## 2023-09-04 RX ADMIN — AZITHROMYCIN 255 MILLIGRAM(S): 500 TABLET, FILM COATED ORAL at 20:46

## 2023-09-04 RX ADMIN — SODIUM CHLORIDE 1000 MILLILITER(S): 9 INJECTION INTRAMUSCULAR; INTRAVENOUS; SUBCUTANEOUS at 18:52

## 2023-09-04 RX ADMIN — CEFTRIAXONE 100 MILLIGRAM(S): 500 INJECTION, POWDER, FOR SOLUTION INTRAMUSCULAR; INTRAVENOUS at 18:52

## 2023-09-04 RX ADMIN — Medication 3 MILLILITER(S): at 17:14

## 2023-09-04 RX ADMIN — Medication 6 MILLIGRAM(S): at 18:53

## 2023-09-04 NOTE — ED PROVIDER NOTE - OBJECTIVE STATEMENT
62-year-old female denies past medical history presenting to ER for evaluation of cough patient has had few days of generalized body aches, weakness productive cough was seen on 08/30 for similar symptoms.  Since then states cough has been worsening with some associated shortness of breath.  Patient went to urgent care today was diagnosed with pneumonia and sent to ER for further eval.  She denies fever, chest pain, leg pain or swelling, vomiting, diarrhea, sick contacts.

## 2023-09-04 NOTE — H&P ADULT - HISTORY OF PRESENT ILLNESS
62-year-old female denies past medical history presenting to ER for evaluation of cough patient has had few days of generalized body aches, weakness productive cough was seen on 08/30 for similar symptoms.  Since then states cough has been worsening with some associated shortness of breath.  Patient went to urgent care today was diagnosed with pneumonia and sent to ER for further eval.  She denies fever, chest pain, leg pain or swelling, vomiting, diarrhea, sick contacts.    ED vitals:  /78, HR 86, Temp 98.8F, satting 96% on room air  WBC 2.3K, Trop neg x1, BNP 22. Flu, COVID, RSV negative  CXR unremarkable (unofficial, f/u read)  EKG NSR    s/p 1L bolus NS, duonebs, decadron 6mg IV, Rocephin 1g IV, Azithromycin 500mg IV in the ED.  Admitted for suspected pneumonia. 62-year-old female with intermittent chronic back pain presenting to ER for evaluation of cough. patient has had few days of generalized body aches, weakness, and productive cough with whitish sputum since last Wednesday, was seen on 08/30 for similar symptoms.  Since then states cough has been worsening with some associated shortness of breath.  Patient went to urgent care today was diagnosed with pneumonia and sent to ER for further eval. Of note, pt states she went to travel to [place] to bury her sister, stayed there for 10 days, returned on 8/17. Pt has no previous hx of asthma/copd, nonsmoker, no use of inhalers, no sick contacts, reports being vaccinated for COVID, also had pneumonia shot.  She denies fever, chest pain, leg pain or swelling, vomiting, diarrhea, sick contacts.    ED vitals:  /78, HR 86, Temp 98.8F, satting 96% on room air  WBC 2.3K, Trop neg x1, BNP 22. Flu, COVID, RSV negative  CXR LLL opacities (unofficial, f/u read)  EKG NSR    s/p 1L bolus NS, duonebs, decadron 6mg IV, Rocephin 1g IV, Azithromycin 500mg IV in the ED.  Admitted for suspected pneumonia.

## 2023-09-04 NOTE — ED PROVIDER NOTE - ATTENDING APP SHARED VISIT CONTRIBUTION OF CARE
62-year-old female no past medical history presents with 4 days of productive cough wheezing shortness of breath substernal chest pressure with cough.  Patient seen in ED on August 30 and had negative viral swab.  Went to urgent care today and had chest x-ray which showed left sided opacity and patient desatted to 91% on room air and was sent to ED.  No history of smoking asthma.  Reports malaise decreased p.o.    On exam, AFVSS, Well appearing, No acute distress, NCAT, EOMI, PERRLA, MMM, Neck supple, scattered wheeze bilaterally no respiratory distress, RRR nl s1s2 No mrg, Abdomen Soft NTND, AAOx3, No Focal Deficits, No LE edema or calf TTP,    A/P; pneumonia wheezing chest pain, malaise, desatted at urgent care and now satting well on room air, will do labs EKG troponin chest x-ray IV fluids nebs steroids IV antibiotics reassess

## 2023-09-04 NOTE — H&P ADULT - ATTENDING COMMENTS
Patient seen and examined at bedside independently of the residents. I read the resident's note and agree with the plan with the additions and corrections as noted below. My note supersedes the resident's note.     REVIEW OF SYSTEMS:  Negative except in HPI.     PMH: None.     FHx: Reviewed. No fhx of asthma/copd, No fhx of liver and pulmonary disease. No fhx of hematological disorder.     Physical Exam:  GEN: No acute distress. Awake, Alert and oriented x 3.   Head: Atraumatic, Normocephalic.   Eye: PEERLA. No sclera icterus. EOMI.   ENT: Normal oropharynx, no thyromegaly, no mass, no lymphadenopathy.   LUNGS: Clear to auscultation bilaterally. No wheeze/rales/crackles.   HEART: Normal. S1/S2 present. RRR. No murmur/gallops.   ABD: Soft, non-tender, non-distended. Bowel sounds present.   EXT: No pitting edema. No erythema. No tenderness.  Integumentary: No rash, No sore, No petechia.   NEURO: CN III-XII intact. Strength: 5/5 b/l ULE. Sensory intact b/l ULE.     Vital Signs Last 24 Hrs  T(C): 36.1 (04 Sep 2023 23:58), Max: 37.1 (04 Sep 2023 14:22)  T(F): 96.9 (04 Sep 2023 23:58), Max: 98.8 (04 Sep 2023 14:22)  HR: 107 (04 Sep 2023 23:58) (85 - 107)  BP: 145/91 (04 Sep 2023 23:58) (135/92 - 145/91)  BP(mean): --  RR: 18 (04 Sep 2023 23:58) (17 - 18)  SpO2: 96% (04 Sep 2023 23:58) (95% - 96%)    Parameters below as of 04 Sep 2023 23:58  Patient On (Oxygen Delivery Method): room air      Please see the above notes for Labs and radiology.     Assessment and Plan:     63 yo F with no significant PMH presents to ED for cough and generalized body aches a/w SOB.     SOB and cough   - Ddx: CAP vs. Bronchitis   - CXR looks LLL patchy opacity to my read --> pending official report.   - s/p Azithro and ceftriaxone x 1 in ED.   - c/w azithro and ceftriaxone for now.   - f/u BCx, RVP, procalcitonin  - supportive care.     DVT ppx: Lovenox SC  GI ppx:  not indicated.   Diet: Regular diet  Activity: as tolerated.     Date seen by the attendin2023

## 2023-09-04 NOTE — H&P ADULT - ASSESSMENT
62-year-old female denies past medical history presenting to ER for evaluation of cough. patient has had few days of generalized body aches, weakness productive cough, worsening with some associated shortness of breath.  Patient went to urgent care today was diagnosed with pneumonia and sent to ER for further eval.  She denies fever, chest pain, leg pain or swelling, vomiting, diarrhea, sick contacts.    #Dyspnea and Cough 2/2 Suspected Viral URI vs pneumonia  - HD stable, no sepsis/SIRS on admission  - WBC 2.3K, Flu/Covid/RSV negative  - CXR unremarkable (unofficial, f/u read)  - EKG NSR  - s/p 1L bolus NS, duonebs, decadron 6mg IV, Rocephin 1g IV, Azithromycin 500mg IV in the ED  - check RVP, MRSA nares, urine legionella, strep, procal, d-dimer  - f/u blood cx  - continue empiric antibiotics with Rocephin and Azithromycin  -     Misc:  DVT ppx: Lovenox  GI ppx: None  Diet: Regular  Activity: IAT  Code: Full Code  Dispo: Acute, medical floor 62-year-old female denies past medical history presenting to ER for evaluation of cough. patient has had few days of generalized body aches, weakness productive cough, worsening with some associated shortness of breath.  Patient went to urgent care today was diagnosed with pneumonia and sent to ER for further eval.  She denies fever, chest pain, leg pain or swelling, vomiting, diarrhea, sick contacts.    #Dyspnea and Cough 2/2 Suspected Bronchitis vs pneumonia  - HD stable, no sepsis/SIRS on admission  - WBC 2.3K, Flu/Covid/RSV negative  - CXR LLL opacities (unofficial, f/u read)  - EKG NSR  - s/p 1L bolus NS, duonebs, decadron 6mg IV, Rocephin 1g IV, Azithromycin 500mg IV in the ED  - check RVP, MRSA nares, urine legionella, strep, procal, d-dimer  - f/u blood cx  - continue empiric antibiotics with Rocephin and Azithromycin    Misc:  DVT ppx: Lovenox  GI ppx: None  Diet: Regular  Activity: IAT  Code: Full Code  Dispo: Acute, medical floor 62-year-old female denies past medical history presenting to ER for evaluation of cough. patient has had few days of generalized body aches, weakness productive cough, worsening with some associated shortness of breath.  Patient went to urgent care today was diagnosed with pneumonia and sent to ER for further eval.  She denies fever, chest pain, leg pain or swelling, vomiting, diarrhea, sick contacts.    #Dyspnea and Cough 2/2 Suspected Bronchitis vs pneumonia  - HD stable, no sepsis/SIRS on admission  - WBC 2.3K, Flu/Covid/RSV negative  - CXR LLL opacities (unofficial, f/u read)  - EKG NSR  - s/p 1L bolus NS, duonebs, decadron 6mg IV, Rocephin 1g IV, Azithromycin 500mg IV in the ED  - check RVP, MRSA nares, urine legionella, strep, procal, d-dimer  - f/u blood cx  - continue empiric antibiotics with Rocephin and Azithromycin  - albuterol and duonebs q6 and prn    Misc:  DVT ppx: Lovenox  GI ppx: None  Diet: Regular  Activity: IAT  Code: Full Code  Dispo: Acute, medical floor

## 2023-09-04 NOTE — ED PROVIDER NOTE - PHYSICAL EXAMINATION
CONSTITUTIONAL: Well-appearing; well-nourished; in no apparent distress.   EYES: PERRL; EOM intact.   ENT: normal nose; no rhinorrhea; normal pharynx with no tonsillar hypertrophy.   NECK: Supple; non-tender; no cervical lymphadenopathy.   CARDIOVASCULAR: Normal S1, S2; no murmurs, rubs, or gallops.   RESPIRATORY: O2 % at rest and with ambulation. Normal chest excursion with respiration; breath sounds clear and equal bilaterally; no wheezes, rhonchi, or rales.  GI/: Normal bowel sounds; non-distended; non-tender; no palpable organomegaly.   MS: No evidence of trauma or deformity.  Normal ROM in all four extremities; non-tender to palpation; distal pulses are normal.   SKIN: Normal for age and race; warm; dry; good turgor; no apparent lesions or exudate.   Extrem: no peripheral edema. No calf ttp  NEURO/PSYCH: A & O x 4; grossly unremarkable. mood and manner are appropriate. Grooming and personal hygiene are appropriate. CONSTITUTIONAL: Well-appearing; well-nourished; in no apparent distress.   EYES: PERRL; EOM intact.   ENT: normal nose; no rhinorrhea; normal pharynx with no tonsillar hypertrophy.   NECK: Supple; non-tender; no cervical lymphadenopathy.   CARDIOVASCULAR: Normal S1, S2; no murmurs, rubs, or gallops.   RESPIRATORY: + mild tachypnea. Normal chest excursion with respiration; breath sounds clear and equal bilaterally; no wheezes, rhonchi, or rales.  GI/: Normal bowel sounds; non-distended; non-tender; no palpable organomegaly.   MS: No evidence of trauma or deformity.  Normal ROM in all four extremities; non-tender to palpation; distal pulses are normal.   SKIN: Normal for age and race; warm; dry; good turgor; no apparent lesions or exudate.   Extrem: no peripheral edema. No calf ttp  NEURO/PSYCH: A & O x 4; grossly unremarkable. mood and manner are appropriate. Grooming and personal hygiene are appropriate.

## 2023-09-05 ENCOUNTER — TRANSCRIPTION ENCOUNTER (OUTPATIENT)
Age: 63
End: 2023-09-05

## 2023-09-05 VITALS
SYSTOLIC BLOOD PRESSURE: 150 MMHG | DIASTOLIC BLOOD PRESSURE: 75 MMHG | HEART RATE: 95 BPM | RESPIRATION RATE: 18 BRPM | TEMPERATURE: 96 F

## 2023-09-05 LAB
ALBUMIN SERPL ELPH-MCNC: 3.5 G/DL — SIGNIFICANT CHANGE UP (ref 3.5–5.2)
ALP SERPL-CCNC: 44 U/L — SIGNIFICANT CHANGE UP (ref 30–115)
ALT FLD-CCNC: 14 U/L — SIGNIFICANT CHANGE UP (ref 0–41)
ANION GAP SERPL CALC-SCNC: 7 MMOL/L — SIGNIFICANT CHANGE UP (ref 7–14)
AST SERPL-CCNC: 20 U/L — SIGNIFICANT CHANGE UP (ref 0–41)
BASOPHILS # BLD AUTO: 0 K/UL — SIGNIFICANT CHANGE UP (ref 0–0.2)
BASOPHILS NFR BLD AUTO: 0 % — SIGNIFICANT CHANGE UP (ref 0–1)
BILIRUB SERPL-MCNC: <0.2 MG/DL — SIGNIFICANT CHANGE UP (ref 0.2–1.2)
BUN SERPL-MCNC: 12 MG/DL — SIGNIFICANT CHANGE UP (ref 10–20)
CALCIUM SERPL-MCNC: 9.1 MG/DL — SIGNIFICANT CHANGE UP (ref 8.4–10.5)
CHLORIDE SERPL-SCNC: 105 MMOL/L — SIGNIFICANT CHANGE UP (ref 98–110)
CO2 SERPL-SCNC: 24 MMOL/L — SIGNIFICANT CHANGE UP (ref 17–32)
CREAT SERPL-MCNC: 0.7 MG/DL — SIGNIFICANT CHANGE UP (ref 0.7–1.5)
D DIMER BLD IA.RAPID-MCNC: 190 NG/ML DDU — SIGNIFICANT CHANGE UP
EGFR: 99 ML/MIN/1.73M2 — SIGNIFICANT CHANGE UP
EOSINOPHIL # BLD AUTO: 0 K/UL — SIGNIFICANT CHANGE UP (ref 0–0.7)
EOSINOPHIL NFR BLD AUTO: 0 % — SIGNIFICANT CHANGE UP (ref 0–8)
GLUCOSE SERPL-MCNC: 122 MG/DL — HIGH (ref 70–99)
HCT VFR BLD CALC: 39.5 % — SIGNIFICANT CHANGE UP (ref 37–47)
HCV AB S/CO SERPL IA: 0.05 COI — SIGNIFICANT CHANGE UP
HCV AB SERPL-IMP: SIGNIFICANT CHANGE UP
HGB BLD-MCNC: 12.4 G/DL — SIGNIFICANT CHANGE UP (ref 12–16)
IMM GRANULOCYTES NFR BLD AUTO: 0.8 % — HIGH (ref 0.1–0.3)
LYMPHOCYTES # BLD AUTO: 0.65 K/UL — LOW (ref 1.2–3.4)
LYMPHOCYTES # BLD AUTO: 24.7 % — SIGNIFICANT CHANGE UP (ref 20.5–51.1)
MAGNESIUM SERPL-MCNC: 1.8 MG/DL — SIGNIFICANT CHANGE UP (ref 1.8–2.4)
MCHC RBC-ENTMCNC: 27.3 PG — SIGNIFICANT CHANGE UP (ref 27–31)
MCHC RBC-ENTMCNC: 31.4 G/DL — LOW (ref 32–37)
MCV RBC AUTO: 86.8 FL — SIGNIFICANT CHANGE UP (ref 81–99)
MONOCYTES # BLD AUTO: 0.14 K/UL — SIGNIFICANT CHANGE UP (ref 0.1–0.6)
MONOCYTES NFR BLD AUTO: 5.3 % — SIGNIFICANT CHANGE UP (ref 1.7–9.3)
NEUTROPHILS # BLD AUTO: 1.82 K/UL — SIGNIFICANT CHANGE UP (ref 1.4–6.5)
NEUTROPHILS NFR BLD AUTO: 69.2 % — SIGNIFICANT CHANGE UP (ref 42.2–75.2)
NRBC # BLD: 0 /100 WBCS — SIGNIFICANT CHANGE UP (ref 0–0)
PLATELET # BLD AUTO: 148 K/UL — SIGNIFICANT CHANGE UP (ref 130–400)
PMV BLD: 10.9 FL — HIGH (ref 7.4–10.4)
POTASSIUM SERPL-MCNC: 4.5 MMOL/L — SIGNIFICANT CHANGE UP (ref 3.5–5)
POTASSIUM SERPL-SCNC: 4.5 MMOL/L — SIGNIFICANT CHANGE UP (ref 3.5–5)
PROCALCITONIN SERPL-MCNC: <0.02 NG/ML — SIGNIFICANT CHANGE UP (ref 0.02–0.1)
PROT SERPL-MCNC: 8.3 G/DL — HIGH (ref 6–8)
RBC # BLD: 4.55 M/UL — SIGNIFICANT CHANGE UP (ref 4.2–5.4)
RBC # FLD: 13.8 % — SIGNIFICANT CHANGE UP (ref 11.5–14.5)
SODIUM SERPL-SCNC: 136 MMOL/L — SIGNIFICANT CHANGE UP (ref 135–146)
WBC # BLD: 2.63 K/UL — LOW (ref 4.8–10.8)
WBC # FLD AUTO: 2.63 K/UL — LOW (ref 4.8–10.8)

## 2023-09-05 PROCEDURE — 99238 HOSP IP/OBS DSCHRG MGMT 30/<: CPT

## 2023-09-05 RX ORDER — ENOXAPARIN SODIUM 100 MG/ML
40 INJECTION SUBCUTANEOUS EVERY 24 HOURS
Refills: 0 | Status: DISCONTINUED | OUTPATIENT
Start: 2023-09-05 | End: 2023-09-05

## 2023-09-05 RX ORDER — AZITHROMYCIN 500 MG/1
500 TABLET, FILM COATED ORAL EVERY 24 HOURS
Refills: 0 | Status: DISCONTINUED | OUTPATIENT
Start: 2023-09-05 | End: 2023-09-05

## 2023-09-05 RX ORDER — GUAIFENESIN/DEXTROMETHORPHAN 600MG-30MG
10 TABLET, EXTENDED RELEASE 12 HR ORAL
Qty: 1 | Refills: 0
Start: 2023-09-05

## 2023-09-05 RX ORDER — MONTELUKAST 4 MG/1
1 TABLET, CHEWABLE ORAL
Qty: 3 | Refills: 0
Start: 2023-09-05 | End: 2023-09-07

## 2023-09-05 RX ORDER — ACETAMINOPHEN 500 MG
650 TABLET ORAL EVERY 6 HOURS
Refills: 0 | Status: DISCONTINUED | OUTPATIENT
Start: 2023-09-05 | End: 2023-09-05

## 2023-09-05 RX ORDER — AZITHROMYCIN 500 MG/1
250 TABLET, FILM COATED ORAL
Qty: 1 | Refills: 0
Start: 2023-09-05 | End: 2023-09-09

## 2023-09-05 RX ORDER — GUAIFENESIN/DEXTROMETHORPHAN 600MG-30MG
10 TABLET, EXTENDED RELEASE 12 HR ORAL EVERY 6 HOURS
Refills: 0 | Status: DISCONTINUED | OUTPATIENT
Start: 2023-09-05 | End: 2023-09-05

## 2023-09-05 RX ORDER — CEFTRIAXONE 500 MG/1
1000 INJECTION, POWDER, FOR SOLUTION INTRAMUSCULAR; INTRAVENOUS EVERY 24 HOURS
Refills: 0 | Status: DISCONTINUED | OUTPATIENT
Start: 2023-09-05 | End: 2023-09-05

## 2023-09-05 RX ORDER — ALBUTEROL 90 UG/1
2 AEROSOL, METERED ORAL EVERY 6 HOURS
Refills: 0 | Status: DISCONTINUED | OUTPATIENT
Start: 2023-09-05 | End: 2023-09-05

## 2023-09-05 RX ORDER — IPRATROPIUM/ALBUTEROL SULFATE 18-103MCG
3 AEROSOL WITH ADAPTER (GRAM) INHALATION EVERY 6 HOURS
Refills: 0 | Status: DISCONTINUED | OUTPATIENT
Start: 2023-09-05 | End: 2023-09-05

## 2023-09-05 RX ADMIN — ENOXAPARIN SODIUM 40 MILLIGRAM(S): 100 INJECTION SUBCUTANEOUS at 05:49

## 2023-09-05 RX ADMIN — Medication 650 MILLIGRAM(S): at 13:13

## 2023-09-05 RX ADMIN — Medication 3 MILLILITER(S): at 13:13

## 2023-09-05 RX ADMIN — Medication 650 MILLIGRAM(S): at 10:51

## 2023-09-05 RX ADMIN — Medication 10 MILLILITER(S): at 11:53

## 2023-09-05 NOTE — PATIENT PROFILE ADULT - NSPROGENSOURCEINFO_GEN_A_NUR
1900--bedside report received from [facility], rn  Pt resting in bed, a&o x 4, denies pain, nausea, family at bedside, call bell in reach. Assessment as noted    2200--pt reports being \"cold\", extra blanket applied no other complains at this time call bell in reach    0240--in to drawn pt labs, pt reports staff having to use vein finder. Informed pt. Vein finder is currently being charged, will return when charged. 0330--back in pt's room, explained attempted with nursing supervisor alec to get vein finder functioning, but it will not hold charge even when plugged in. Pt refuses to attempt lab draw without vein finder.     0700--bedside report given to rosemarie fisher who is assuming care of pt patient

## 2023-09-05 NOTE — PATIENT PROFILE ADULT - FALL HARM RISK - HARM RISK INTERVENTIONS

## 2023-09-05 NOTE — DISCHARGE NOTE PROVIDER - NSDCMRMEDTOKEN_GEN_ALL_CORE_FT
Azithromycin 5 Day Dose Pack 250 mg oral tablet: 250 milligram(s) orally once a day   predniSONE 20 mg oral tablet: 1 tab(s) orally once a day

## 2023-09-05 NOTE — DISCHARGE NOTE PROVIDER - HOSPITAL COURSE
62-year-old female with intermittent chronic back pain presenting to ER for evaluation of cough. patient has had few days of generalized body aches, weakness, and productive cough with whitish sputum since last Wednesday, was seen on 08/30 for similar symptoms.  Since then states cough has been worsening with some associated shortness of breath.  Patient went to urgent care today was diagnosed with pneumonia and sent to ER for further eval. Of note, pt states she went to travel to [place] to bury her sister, stayed there for 10 days, returned on 8/17. Pt has no previous hx of asthma/copd, nonsmoker, no use of inhalers, no sick contacts, reports being vaccinated for COVID, also had pneumonia shot.  She denies fever, chest pain, leg pain or swelling, vomiting, diarrhea, sick contacts.    ED vitals:  /78, HR 86, Temp 98.8F, satting 96% on room air  WBC 2.3K, Trop neg x1, BNP 22. Flu, COVID, RSV negative  CXR LLL opacities (unofficial, f/u read)  EKG NSR    s/p 1L bolus NS, duonebs, decadron 6mg IV, Rocephin 1g IV, Azithromycin 500mg IV in the ED.  Admitted for suspected pneumonia.    Patient received CXR which showed no changes from previous CT 3 years ago. Pneumonia unlikely so antibiotics were discontinued. Diagnosis of bronchitis or ILD is more likely. Since admission, patient's symptoms have improved drastically and is stable to be discharged.    #Dyspnea and Cough 2/2 Suspected Bronchitis vs pneumonia  - HD stable, no sepsis/SIRS on admission  - WBC 2.3K, Flu/Covid/RSV negative  - CXR LLL opacities, no change from previous CT from 3 years ago  - EKG NSR  - s/p 1L bolus NS, duonebs, decadron 6mg IV, Rocephin 1g IV, Azithromycin 500mg IV in the ED  - albuterol and duonebs q6 and prn   62-year-old female with intermittent chronic back pain presenting to ER for evaluation of cough. patient has had few days of generalized body aches, weakness, and productive cough with whitish sputum since last Wednesday, was seen on 08/30 for similar symptoms.  Since then states cough has been worsening with some associated shortness of breath.  Patient went to urgent care today was diagnosed with pneumonia and sent to ER for further eval. Of note, pt states she went to travel to [place] to bury her sister, stayed there for 10 days, returned on 8/17. Pt has no previous hx of asthma/copd, nonsmoker, no use of inhalers, no sick contacts, reports being vaccinated for COVID, also had pneumonia shot.  She denies fever, chest pain, leg pain or swelling, vomiting, diarrhea, sick contacts.    ED vitals:  /78, HR 86, Temp 98.8F, satting 96% on room air  WBC 2.3K, Trop neg x1, BNP 22. Flu, COVID, RSV negative  CXR LLL opacities (unofficial, f/u read)  EKG NSR    s/p 1L bolus NS, duonebs, decadron 6mg IV, Rocephin 1g IV, Azithromycin 500mg IV in the ED.  Admitted for suspected pneumonia.    Patient received CXR which showed no changes from previous CT 3 years ago. Pneumonia unlikely so antibiotics were discontinued. Diagnosis of bronchitis or ILD is more likely. Since admission, patient's symptoms have improved drastically and is stable to be discharged.    Attending-seen this morning, vitals stable, no evidence of pneumonia  Outpatient follow up for chronic interstitial lung findings and for leukopenia

## 2023-09-05 NOTE — DISCHARGE NOTE NURSING/CASE MANAGEMENT/SOCIAL WORK - PATIENT PORTAL LINK FT
You can access the FollowMyHealth Patient Portal offered by [facility] by registering at the following website: [email or web address]. By joining [facility]’s FollowMyHealth portal, you will also be able to view your health information using other applications (apps) compatible with our system.

## 2023-09-05 NOTE — DISCHARGE NOTE PROVIDER - NSDCCPCAREPLAN_GEN_ALL_CORE_FT
PRINCIPAL DISCHARGE DIAGNOSIS  Diagnosis: Pneumonia  Assessment and Plan of Treatment: You were evaluated in the hospital for your cough and weakness.  Coughing is a reflex that clears your throat and your airways. Coughing helps to heal and protect your lungs. It is normal to cough occasionally, but a cough that happens with other symptoms or lasts a long time may be a sign of a condition that needs treatment. Coughing may be caused by infections, asthma or COPD, smoking, postnasal drip, gastroesophageal reflux, as well as other medical conditions. Take medicines only as instructed by your health care provider. Avoid environments or triggers that causes you to cough at work or at home.  After discharge, you will need to:   - Follow up with your primary care doctor within 1-2 weeks  - Follow up with pulmonary within 1-2 weeks  - Take all the medications you were discharged with, unless otherwise instructed by your healthcare provider(s).   Please follow up with your providers by calling them to make an appointment so that you can see them in 1-2weeks; bring your paperwork from this hospital stay to that visit. You can access your visit information by signing up for an account for the patient portal at [email or web address]   Seek immediate medical attention if you develop fevers, chills, chest pain, shortness of breath, nausea and vomiting, abdominal pain, passing out, weakness or numbness or tingling on one side of your body, or any other concerning signs or symptoms.        PRINCIPAL DISCHARGE DIAGNOSIS  Diagnosis: Interstitial lung disease  Assessment and Plan of Treatment: You were evaluated in the hospital for your cough and weakness.  After discharge, you will need to:   - Follow up with your primary care doctor within 1-2 weeks  - Follow up with pulmonology within 1-2 weeks  - Take all the medications you were discharged with, unless otherwise instructed by your healthcare provider(s).   Please follow up with your providers by calling them to make an appointment so that you can see them in 1-2weeks; bring your paperwork from this hospital stay to that visit. You can access your visit information by signing up for an account for the patient portal at [email or web address]   Seek immediate medical attention if you develop fevers, chills, chest pain, shortness of breath, nausea and vomiting, abdominal pain, passing out, weakness or numbness or tingling on one side of your body, or any other concerning signs or symptoms.

## 2023-09-12 DIAGNOSIS — J84.9 INTERSTITIAL PULMONARY DISEASE, UNSPECIFIED: ICD-10-CM

## 2023-09-12 DIAGNOSIS — D72.819 DECREASED WHITE BLOOD CELL COUNT, UNSPECIFIED: ICD-10-CM

## 2023-11-26 ENCOUNTER — EMERGENCY (EMERGENCY)
Facility: HOSPITAL | Age: 63
LOS: 0 days | Discharge: HOME | End: 2023-11-26
Attending: EMERGENCY MEDICINE
Payer: COMMERCIAL

## 2023-11-26 VITALS
TEMPERATURE: 98 F | HEART RATE: 72 BPM | SYSTOLIC BLOOD PRESSURE: 113 MMHG | RESPIRATION RATE: 18 BRPM | OXYGEN SATURATION: 97 % | DIASTOLIC BLOOD PRESSURE: 74 MMHG

## 2023-11-26 VITALS
TEMPERATURE: 98 F | RESPIRATION RATE: 18 BRPM | HEART RATE: 100 BPM | SYSTOLIC BLOOD PRESSURE: 124 MMHG | WEIGHT: 184.97 LBS | DIASTOLIC BLOOD PRESSURE: 70 MMHG | OXYGEN SATURATION: 99 %

## 2023-11-26 DIAGNOSIS — R07.89 OTHER CHEST PAIN: ICD-10-CM

## 2023-11-26 LAB
ALBUMIN SERPL ELPH-MCNC: 3.8 G/DL — SIGNIFICANT CHANGE UP (ref 3.5–5.2)
ALBUMIN SERPL ELPH-MCNC: 3.8 G/DL — SIGNIFICANT CHANGE UP (ref 3.5–5.2)
ALP SERPL-CCNC: 44 U/L — SIGNIFICANT CHANGE UP (ref 30–115)
ALP SERPL-CCNC: 44 U/L — SIGNIFICANT CHANGE UP (ref 30–115)
ALT FLD-CCNC: 14 U/L — SIGNIFICANT CHANGE UP (ref 0–41)
ALT FLD-CCNC: 14 U/L — SIGNIFICANT CHANGE UP (ref 0–41)
ANION GAP SERPL CALC-SCNC: 9 MMOL/L — SIGNIFICANT CHANGE UP (ref 7–14)
ANION GAP SERPL CALC-SCNC: 9 MMOL/L — SIGNIFICANT CHANGE UP (ref 7–14)
AST SERPL-CCNC: 23 U/L — SIGNIFICANT CHANGE UP (ref 0–41)
AST SERPL-CCNC: 23 U/L — SIGNIFICANT CHANGE UP (ref 0–41)
BASOPHILS # BLD AUTO: 0 K/UL — SIGNIFICANT CHANGE UP (ref 0–0.2)
BASOPHILS # BLD AUTO: 0 K/UL — SIGNIFICANT CHANGE UP (ref 0–0.2)
BASOPHILS NFR BLD AUTO: 0 % — SIGNIFICANT CHANGE UP (ref 0–1)
BASOPHILS NFR BLD AUTO: 0 % — SIGNIFICANT CHANGE UP (ref 0–1)
BILIRUB SERPL-MCNC: <0.2 MG/DL — SIGNIFICANT CHANGE UP (ref 0.2–1.2)
BILIRUB SERPL-MCNC: <0.2 MG/DL — SIGNIFICANT CHANGE UP (ref 0.2–1.2)
BUN SERPL-MCNC: 15 MG/DL — SIGNIFICANT CHANGE UP (ref 10–20)
BUN SERPL-MCNC: 15 MG/DL — SIGNIFICANT CHANGE UP (ref 10–20)
CALCIUM SERPL-MCNC: 8.7 MG/DL — SIGNIFICANT CHANGE UP (ref 8.4–10.5)
CALCIUM SERPL-MCNC: 8.7 MG/DL — SIGNIFICANT CHANGE UP (ref 8.4–10.5)
CHLORIDE SERPL-SCNC: 102 MMOL/L — SIGNIFICANT CHANGE UP (ref 98–110)
CHLORIDE SERPL-SCNC: 102 MMOL/L — SIGNIFICANT CHANGE UP (ref 98–110)
CO2 SERPL-SCNC: 27 MMOL/L — SIGNIFICANT CHANGE UP (ref 17–32)
CO2 SERPL-SCNC: 27 MMOL/L — SIGNIFICANT CHANGE UP (ref 17–32)
CREAT SERPL-MCNC: 0.8 MG/DL — SIGNIFICANT CHANGE UP (ref 0.7–1.5)
CREAT SERPL-MCNC: 0.8 MG/DL — SIGNIFICANT CHANGE UP (ref 0.7–1.5)
EGFR: 84 ML/MIN/1.73M2 — SIGNIFICANT CHANGE UP
EGFR: 84 ML/MIN/1.73M2 — SIGNIFICANT CHANGE UP
EOSINOPHIL # BLD AUTO: 0 K/UL — SIGNIFICANT CHANGE UP (ref 0–0.7)
EOSINOPHIL # BLD AUTO: 0 K/UL — SIGNIFICANT CHANGE UP (ref 0–0.7)
EOSINOPHIL NFR BLD AUTO: 0 % — SIGNIFICANT CHANGE UP (ref 0–8)
EOSINOPHIL NFR BLD AUTO: 0 % — SIGNIFICANT CHANGE UP (ref 0–8)
GLUCOSE SERPL-MCNC: 97 MG/DL — SIGNIFICANT CHANGE UP (ref 70–99)
GLUCOSE SERPL-MCNC: 97 MG/DL — SIGNIFICANT CHANGE UP (ref 70–99)
HCT VFR BLD CALC: 39.9 % — SIGNIFICANT CHANGE UP (ref 37–47)
HCT VFR BLD CALC: 39.9 % — SIGNIFICANT CHANGE UP (ref 37–47)
HGB BLD-MCNC: 12.5 G/DL — SIGNIFICANT CHANGE UP (ref 12–16)
HGB BLD-MCNC: 12.5 G/DL — SIGNIFICANT CHANGE UP (ref 12–16)
LIDOCAIN IGE QN: 74 U/L — HIGH (ref 7–60)
LIDOCAIN IGE QN: 74 U/L — HIGH (ref 7–60)
LYMPHOCYTES # BLD AUTO: 0.53 K/UL — LOW (ref 1.2–3.4)
LYMPHOCYTES # BLD AUTO: 0.53 K/UL — LOW (ref 1.2–3.4)
LYMPHOCYTES # BLD AUTO: 25.9 % — SIGNIFICANT CHANGE UP (ref 20.5–51.1)
LYMPHOCYTES # BLD AUTO: 25.9 % — SIGNIFICANT CHANGE UP (ref 20.5–51.1)
MANUAL SMEAR VERIFICATION: SIGNIFICANT CHANGE UP
MANUAL SMEAR VERIFICATION: SIGNIFICANT CHANGE UP
MCHC RBC-ENTMCNC: 27 PG — SIGNIFICANT CHANGE UP (ref 27–31)
MCHC RBC-ENTMCNC: 27 PG — SIGNIFICANT CHANGE UP (ref 27–31)
MCHC RBC-ENTMCNC: 31.3 G/DL — LOW (ref 32–37)
MCHC RBC-ENTMCNC: 31.3 G/DL — LOW (ref 32–37)
MCV RBC AUTO: 86.2 FL — SIGNIFICANT CHANGE UP (ref 81–99)
MCV RBC AUTO: 86.2 FL — SIGNIFICANT CHANGE UP (ref 81–99)
MONOCYTES # BLD AUTO: 0.23 K/UL — SIGNIFICANT CHANGE UP (ref 0.1–0.6)
MONOCYTES # BLD AUTO: 0.23 K/UL — SIGNIFICANT CHANGE UP (ref 0.1–0.6)
MONOCYTES NFR BLD AUTO: 11.1 % — HIGH (ref 1.7–9.3)
MONOCYTES NFR BLD AUTO: 11.1 % — HIGH (ref 1.7–9.3)
NEUTROPHILS # BLD AUTO: 1.3 K/UL — LOW (ref 1.4–6.5)
NEUTROPHILS # BLD AUTO: 1.3 K/UL — LOW (ref 1.4–6.5)
NEUTROPHILS NFR BLD AUTO: 63 % — SIGNIFICANT CHANGE UP (ref 42.2–75.2)
NEUTROPHILS NFR BLD AUTO: 63 % — SIGNIFICANT CHANGE UP (ref 42.2–75.2)
NRBC # BLD: 4 /100 WBCS — HIGH (ref 0–0)
NRBC # BLD: 4 /100 WBCS — HIGH (ref 0–0)
NRBC # BLD: SIGNIFICANT CHANGE UP /100 WBCS (ref 0–0)
NRBC # BLD: SIGNIFICANT CHANGE UP /100 WBCS (ref 0–0)
PLAT MORPH BLD: NORMAL — SIGNIFICANT CHANGE UP
PLAT MORPH BLD: NORMAL — SIGNIFICANT CHANGE UP
PLATELET # BLD AUTO: 177 K/UL — SIGNIFICANT CHANGE UP (ref 130–400)
PLATELET # BLD AUTO: 177 K/UL — SIGNIFICANT CHANGE UP (ref 130–400)
PMV BLD: 11 FL — HIGH (ref 7.4–10.4)
PMV BLD: 11 FL — HIGH (ref 7.4–10.4)
POIKILOCYTOSIS BLD QL AUTO: SLIGHT — SIGNIFICANT CHANGE UP
POIKILOCYTOSIS BLD QL AUTO: SLIGHT — SIGNIFICANT CHANGE UP
POTASSIUM SERPL-MCNC: 3.6 MMOL/L — SIGNIFICANT CHANGE UP (ref 3.5–5)
POTASSIUM SERPL-MCNC: 3.6 MMOL/L — SIGNIFICANT CHANGE UP (ref 3.5–5)
POTASSIUM SERPL-SCNC: 3.6 MMOL/L — SIGNIFICANT CHANGE UP (ref 3.5–5)
POTASSIUM SERPL-SCNC: 3.6 MMOL/L — SIGNIFICANT CHANGE UP (ref 3.5–5)
PROT SERPL-MCNC: 8.9 G/DL — HIGH (ref 6–8)
PROT SERPL-MCNC: 8.9 G/DL — HIGH (ref 6–8)
RBC # BLD: 4.63 M/UL — SIGNIFICANT CHANGE UP (ref 4.2–5.4)
RBC # BLD: 4.63 M/UL — SIGNIFICANT CHANGE UP (ref 4.2–5.4)
RBC # FLD: 14.5 % — SIGNIFICANT CHANGE UP (ref 11.5–14.5)
RBC # FLD: 14.5 % — SIGNIFICANT CHANGE UP (ref 11.5–14.5)
RBC BLD AUTO: ABNORMAL
RBC BLD AUTO: ABNORMAL
SMUDGE CELLS # BLD: PRESENT — SIGNIFICANT CHANGE UP
SMUDGE CELLS # BLD: PRESENT — SIGNIFICANT CHANGE UP
SODIUM SERPL-SCNC: 138 MMOL/L — SIGNIFICANT CHANGE UP (ref 135–146)
SODIUM SERPL-SCNC: 138 MMOL/L — SIGNIFICANT CHANGE UP (ref 135–146)
TROPONIN T SERPL-MCNC: <0.01 NG/ML — SIGNIFICANT CHANGE UP
TROPONIN T SERPL-MCNC: <0.01 NG/ML — SIGNIFICANT CHANGE UP
WBC # BLD: 2.06 K/UL — LOW (ref 4.8–10.8)
WBC # BLD: 2.06 K/UL — LOW (ref 4.8–10.8)
WBC # FLD AUTO: 2.06 K/UL — LOW (ref 4.8–10.8)
WBC # FLD AUTO: 2.06 K/UL — LOW (ref 4.8–10.8)

## 2023-11-26 PROCEDURE — 84484 ASSAY OF TROPONIN QUANT: CPT

## 2023-11-26 PROCEDURE — 93005 ELECTROCARDIOGRAM TRACING: CPT

## 2023-11-26 PROCEDURE — 80053 COMPREHEN METABOLIC PANEL: CPT

## 2023-11-26 PROCEDURE — 83690 ASSAY OF LIPASE: CPT

## 2023-11-26 PROCEDURE — 71045 X-RAY EXAM CHEST 1 VIEW: CPT

## 2023-11-26 PROCEDURE — 85025 COMPLETE CBC W/AUTO DIFF WBC: CPT

## 2023-11-26 PROCEDURE — 71045 X-RAY EXAM CHEST 1 VIEW: CPT | Mod: 26

## 2023-11-26 PROCEDURE — 99285 EMERGENCY DEPT VISIT HI MDM: CPT

## 2023-11-26 PROCEDURE — 93010 ELECTROCARDIOGRAM REPORT: CPT

## 2023-11-26 PROCEDURE — 36415 COLL VENOUS BLD VENIPUNCTURE: CPT

## 2023-11-26 PROCEDURE — 99285 EMERGENCY DEPT VISIT HI MDM: CPT | Mod: 25

## 2023-11-26 PROCEDURE — 96374 THER/PROPH/DIAG INJ IV PUSH: CPT

## 2023-11-26 RX ORDER — KETOROLAC TROMETHAMINE 30 MG/ML
30 SYRINGE (ML) INJECTION ONCE
Refills: 0 | Status: DISCONTINUED | OUTPATIENT
Start: 2023-11-26 | End: 2023-11-26

## 2023-11-26 RX ADMIN — Medication 30 MILLIGRAM(S): at 20:59

## 2023-11-26 NOTE — ED PROVIDER NOTE - NSFOLLOWUPINSTRUCTIONS_ED_ALL_ED_FT
Chest Pain    Chest pain can be caused by many different conditions which may or may not be dangerous. Causes include heartburn, lung infections, heart attack, blood clot in lungs, skin infections, strain or damage to muscle, cartilage, or bones, etc. In addition to a history and physical examination, an electrocardiogram (ECG) or other lab tests may have been performed to determine the cause of your chest pain. Follow up with your primary care provider or with a cardiologist as instructed.     SEEK IMMEDIATE MEDICAL CARE IF YOU HAVE ANY OF THE FOLLOWING SYMPTOMS: worsening chest pain, coughing up blood, unexplained back/neck/jaw pain, severe abdominal pain, dizziness or lightheadedness, fainting, shortness of breath, sweaty or clammy skin, vomiting, or racing heart beat. These symptoms may represent a serious problem that is an emergency. Do not wait to see if the symptoms will go away. Get medical help right away. Call 911 and do not drive yourself to the hospital.      Back Pain    Back pain is very common in adults. The cause of back pain is rarely dangerous and the pain often gets better over time. The cause of your back pain may not be known and may include strain of muscles or ligaments, degeneration of the spinal disks, or arthritis. Occasionally the pain may radiate down your leg(s). Over-the-counter medicines to reduce pain and inflammation are often the most helpful. Stretching and remaining active frequently helps the healing process.     SEEK IMMEDIATE MEDICAL CARE IF YOU HAVE ANY OF THE FOLLOWING SYMPTOMS: bowel or bladder control problems, unusual weakness or numbness in your arms or legs, nausea or vomiting, abdominal pain, fever, dizziness/lightheadedness.

## 2023-11-26 NOTE — ED PROVIDER NOTE - PATIENT PORTAL LINK FT
You can access the FollowMyHealth Patient Portal offered by [facility] by registering at the following website: [email or web address]. By joining [facility]’s FollowMyHealth portal, you will also be able to view your health information using other applications (apps) compatible with our system.

## 2023-11-26 NOTE — ED PROVIDER NOTE - OBJECTIVE STATEMENT
62 y/o F w no pmhx presents here w/ chest pain X 5 days. Pain is intermittent. Pain is noted to b/l lower chest radiating to back. 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 Denies smoking hx, family hx of early cardiac disease, fever, chills, SOB, cough, palpitations, n/v, abd pain, LE edema. Pain is non-exertional. Pain improves w motrin, did not take medication today.

## 2023-11-26 NOTE — ED PROVIDER NOTE - PHYSICAL EXAMINATION
VITAL SIGNS: I have reviewed nursing notes and confirm.  CONSTITUTIONAL: non-toxic, well appearing  SKIN: no rash, no petechiae.  EYES: pink conjunctiva, anicteric  ENT: tongue midline, no exudates, MMM  NECK: Supple; no meningismus  CARD: RRR, no murmurs, equal radial pulses bilaterally 2+  RESP: CTAB, no respiratory distress  ABD: Soft, non-tender, non-distended  BACK: no stepoffs, no deformities, no spinal tenderness   MSK: + b/l chest wall ttp lower ribs, no ecchymosis, no deformities   EXT: Normal ROM x4. No edema.   NEURO: Alert, oriented.   PSYCH: Cooperative, appropriate.

## 2023-11-26 NOTE — ED PROVIDER NOTE - CLINICAL SUMMARY MEDICAL DECISION MAKING FREE TEXT BOX
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)  no fever, cough  states pain is intermittant and improved w/ motrin  no sudden/severe/maximal at onset  no rashes    wd/wn  nad  rrr s1s2 wnl  cta b/l  + b/l chest wall ttp lower ribs  no midline ttp of back  from X 4  gait stable  aao X 3  nt/nd + BS    64 yo woman w/ atypical chest pain more likely MSK and inconsistent w/ ACS  ECG, Tn X 1, analgesics and reassess 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 ECG non-ischemic, Tn negative making pt very low risk for ACS given history. Much more likely MSK pain. discussed w/ patient and will d/c home w/ outpatient f/u

## 2023-11-26 NOTE — ED PROVIDER NOTE - CARE PROVIDER_API CALL
Wolf Landeros  Interventional Cardiology  [address] [place] [address], [address]  [place] [address]  Phone: [phone]  Fax: [phone]  Follow Up Time: 1-3 Days

## 2023-11-26 NOTE — ED PROVIDER NOTE - TOBACCO USE
Subjective:   Kalli Jj is a 35 y.o. female here today for vaginal discharge    Vaginal discharge  She reports about 2 weeks ago developing increased vaginal discharge.  She states that she had sex with an old partner prior to this and used a condom.  Partner is asymptomatic.  She has noticed some mild dysuria.       Current medicines (including changes today)  Current Outpatient Medications   Medication Sig Dispense Refill   • clindamycin-benzoyl peroxide (BENZACLIN) gel Apply thin layer twice daily as needed to areas of acne on face 35 g 3   • VITAMIN D PO Take 1 Tablet by mouth every day.     • sertraline (ZOLOFT) 50 MG Tab Take 1/2 tab daily for 1 week then increase to 1 full tab daily 30 Tablet 1   • traZODone (DESYREL) 50 MG Tab Take 1-2 Tablets by mouth at bedtime as needed for Sleep. 30 Tablet 1   • Multiple Vitamins-Minerals (ZINC PO) Take  by mouth.       No current facility-administered medications for this visit.     She  has a past medical history of Abnormal Pap smear, low grade squamous intraepithelial lesion (LGSIL) (12/11/2012), COVID-19, and Depression.         Objective:     Vitals:    05/25/22 1052   BP: 110/70   Pulse: 94   Resp: 16   Temp: 36.6 °C (97.9 °F)   SpO2: 98%     Body mass index is 22.61 kg/m².   Physical Exam:  Constitutional: Alert, no distress.  Skin: Warm, dry, good turgor, no rashes in visible areas.  Eye: Equal, round and reactive, conjunctiva clear, lids normal.  : external genitalia normal, moderate amount of thick white discharge present in vaginal canal, no cervical motion tenderness, cervix appears normal      Assessment and Plan:   The following treatment plan was discussed    1. Screening for malignant neoplasm of cervix  - THINPREP PAP W/HPV AND CTNG; Future    2. Vaginal discharge  - VAGINAL PATHOGENS DNA PANEL; Future        Followup: Return if symptoms worsen or fail to improve.         
Never smoker

## 2023-12-18 ENCOUNTER — NON-APPOINTMENT (OUTPATIENT)
Age: 63
End: 2023-12-18

## 2024-04-14 ENCOUNTER — EMERGENCY (EMERGENCY)
Facility: HOSPITAL | Age: 64
LOS: 0 days | Discharge: HOME | End: 2024-04-14
Attending: EMERGENCY MEDICINE
Payer: COMMERCIAL

## 2024-04-14 VITALS
HEART RATE: 100 BPM | OXYGEN SATURATION: 100 % | DIASTOLIC BLOOD PRESSURE: 73 MMHG | TEMPERATURE: 98 F | SYSTOLIC BLOOD PRESSURE: 133 MMHG | WEIGHT: 177.03 LBS | RESPIRATION RATE: 18 BRPM

## 2024-04-14 DIAGNOSIS — U07.1 COVID-19: ICD-10-CM

## 2024-04-14 DIAGNOSIS — R05.9 COUGH, UNSPECIFIED: ICD-10-CM

## 2024-04-14 DIAGNOSIS — R09.81 NASAL CONGESTION: ICD-10-CM

## 2024-04-14 LAB
FLUAV AG NPH QL: SIGNIFICANT CHANGE UP
FLUBV AG NPH QL: SIGNIFICANT CHANGE UP
RSV RNA NPH QL NAA+NON-PROBE: SIGNIFICANT CHANGE UP
SARS-COV-2 RNA SPEC QL NAA+PROBE: DETECTED

## 2024-04-14 PROCEDURE — 0241U: CPT

## 2024-04-14 PROCEDURE — 99283 EMERGENCY DEPT VISIT LOW MDM: CPT | Mod: 25

## 2024-04-14 PROCEDURE — 71046 X-RAY EXAM CHEST 2 VIEWS: CPT | Mod: 26

## 2024-04-14 PROCEDURE — 94640 AIRWAY INHALATION TREATMENT: CPT

## 2024-04-14 PROCEDURE — 99284 EMERGENCY DEPT VISIT MOD MDM: CPT

## 2024-04-14 PROCEDURE — 71046 X-RAY EXAM CHEST 2 VIEWS: CPT

## 2024-04-14 RX ORDER — IPRATROPIUM/ALBUTEROL SULFATE 18-103MCG
3 AEROSOL WITH ADAPTER (GRAM) INHALATION
Refills: 0 | Status: DISCONTINUED | OUTPATIENT
Start: 2024-04-14 | End: 2024-04-14

## 2024-04-14 RX ADMIN — Medication 3 MILLILITER(S): at 22:09

## 2024-04-14 NOTE — ED ADULT NURSE NOTE - OBJECTIVE STATEMENT
Pt presents to ED c/o flu like symptoms including cough x4 days/ congestion/fatigue. Denies fever or SOB.

## 2024-04-14 NOTE — ED PROVIDER NOTE - OBJECTIVE STATEMENT
63-year-old female with no PMH, 1 prior hospitalization for pneumonia presents ED for evaluation of cough x 1 week.  Describes multiple episodes of coughing fits, cough productive of white sputum.  She denies fever/chills, sore throat, ear pain, CP, SOB, palpitations, diaphoresis, abdominal pain, N/V/D, extremity numbness/weakness/paresthesias.    PMD Dr. Rutherford

## 2024-04-14 NOTE — ED PROVIDER NOTE - NSFOLLOWUPINSTRUCTIONS_ED_ALL_ED_FT
Viral Illness, Pediatric  Viruses are tiny germs that can get into a person's body and cause illness. There are many different types of viruses, and they cause many types of illness. Viral illness in children is very common. Most viral illnesses that affect children are not serious. Most go away after several days without treatment.    For children, the most common short-term conditions that are caused by a virus include:  Cold and flu (influenza) viruses.  Stomach viruses.  Viruses that cause fever and rash. These include illnesses such as measles, rubella, roseola, fifth disease, and chickenpox.  Long-term conditions that are caused by a virus include herpes, polio, and HIV (human immunodeficiency virus) infection. A few viruses have been linked to certain cancers.    What are the causes?  Many types of viruses can cause illness. Viruses invade cells in your child's body, multiply, and cause the infected cells to work abnormally or die. When these cells die, they release more of the virus. When this happens, your child develops symptoms of the illness, and the virus continues to spread to other cells. If the virus takes over the function of the cell, it can cause the cell to divide and grow out of control. This happens when a virus causes cancer.    Different viruses get into the body in different ways. Your child is most likely to get a virus from being exposed to another person who is infected with a virus. This may happen at home, at school, or at . Your child may get a virus by:  Breathing in droplets that have been coughed or sneezed into the air by an infected person. Cold and flu viruses, as well as viruses that cause fever and rash, are often spread through these droplets.  Touching anything that has the virus on it (is contaminated) and then touching his or her nose, mouth, or eyes. Objects can be contaminated with a virus if:  They have droplets on them from a recent cough or sneeze of an infected person.  They have been in contact with the vomit or stool (feces) of an infected person. Stomach viruses can spread through vomit or stool.  Eating or drinking anything that has been in contact with the virus.  Being bitten by an insect or animal that carries the virus.  Being exposed to blood or fluids that contain the virus, either through an open cut or during a transfusion.  What are the signs or symptoms?  Your child may have these symptoms, depending on the type of virus and the location of the cells that it invades:  Cold and flu viruses:  Fever.  Sore throat.  Muscle aches and headache.  Stuffy nose.  Earache.  Cough.  Stomach viruses:  Fever.  Loss of appetite.  Vomiting.  Stomachache.  Diarrhea.  Fever and rash viruses:  Fever.  Swollen glands.  Rash.  Runny nose.  How is this diagnosed?  This condition may be diagnosed based on one or more of the following:  Symptoms.  Medical history.  Physical exam.  Blood test, sample of mucus from the lungs (sputum sample), or a swab of body fluids or a skin sore (lesion).  How is this treated?  Most viral illnesses in children go away within 3–10 days. In most cases, treatment is not needed. Your child's health care provider may suggest over-the-counter medicines to relieve symptoms.    A viral illness cannot be treated with antibiotic medicines. Viruses live inside cells, and antibiotics do not get inside cells. Instead, antiviral medicines are sometimes used to treat viral illness, but these medicines are rarely needed in children.    Many childhood viral illnesses can be prevented with vaccinations (immunization shots). These shots help prevent the flu and many of the fever and rash viruses.    Follow these instructions at home:  Medicines    Give over-the-counter and prescription medicines only as told by your child's health care provider. Cold and flu medicines are usually not needed. If your child has a fever, ask the health care provider what over-the-counter medicine to use and what amount, or dose, to give.  Do not give your child aspirin because of the association with Reye's syndrome.  If your child is older than 4 years and has a cough or sore throat, ask the health care provider if you can give cough drops or a throat lozenge.  Do not ask for an antibiotic prescription if your child has been diagnosed with a viral illness. Antibiotics will not make your child's illness go away faster. Also, frequently taking antibiotics when they are not needed can lead to antibiotic resistance. When this develops, the medicine no longer works against the bacteria that it normally fights.  If your child was prescribed an antiviral medicine, give it as told by your child's health care provider. Do not stop giving the antiviral even if your child starts to feel better.  Eating and drinking      If your child is vomiting, give only sips of clear fluids. Offer sips of fluid often. Follow instructions from your child's health care provider about eating or drinking restrictions.  If your child can drink fluids, have the child drink enough fluids to keep his or her urine pale yellow.  General instructions    Make sure your child gets plenty of rest.  If your child has a stuffy nose, ask the health care provider if you can use saltwater nose drops or spray.  If your child has a cough, use a cool-mist humidifier in your child's room.  If your child is older than 1 year and has a cough, ask the health care provider if you can give teaspoons of honey and how often.  Keep your child home and rested until symptoms have cleared up. Have your child return to his or her normal activities as told by your child's health care provider. Ask your child's health care provider what activities are safe for your child.  Keep all follow-up visits as told by your child's health care provider. This is important.  How is this prevented?    To reduce your child's risk of viral illness:  Teach your child to wash his or her hands often with soap and water for at least 20 seconds. If soap and water are not available, he or she should use hand .  Teach your child to avoid touching his or her nose, eyes, and mouth, especially if the child has not washed his or her hands recently.  If anyone in your household has a viral infection, clean all household surfaces that may have been in contact with the virus. Use soap and hot water. You may also use bleach that you have added water to (diluted).  Keep your child away from people who are sick with symptoms of a viral infection.  Teach your child to not share items such as toothbrushes and water bottles with other people.  Keep all of your child's immunizations up to date.  Have your child eat a healthy diet and get plenty of rest.  Contact a health care provider if:  Your child has symptoms of a viral illness for longer than expected. Ask the health care provider how long symptoms should last.  Treatment at home is not controlling your child's symptoms or they are getting worse.  Your child has vomiting that lasts longer than 24 hours.  Get help right away if:  Your child who is younger than 3 months has a temperature of 100.4°F (38°C) or higher.  Your child who is 3 months to 3 years old has a temperature of 102.2°F (39°C) or higher.  Your child has trouble breathing.  Your child has a severe headache or a stiff neck.  These symptoms may represent a serious problem that is an emergency. Do not wait to see if the symptoms will go away. Get medical help right away. Call your local emergency services (911 in the [place]    Summary  Viruses are tiny germs that can get into a person's body and cause illness.  Most viral illnesses that affect children are not serious. Most go away after several days without treatment.  Symptoms may include fever, sore throat, cough, diarrhea, or rash.  Give over-the-counter and prescription medicines only as told by your child's health care provider. Cold and flu medicines are usually not needed. If your child has a fever, ask the health care provider what over-the-counter medicine to use and what amount to give.  Contact a health care provider if your child has symptoms of a viral illness for longer than expected. Ask the health care provider how long symptoms should last.  This information is not intended to replace advice given to you by your health care provider. Make sure you discuss any questions you have with your health care provider. you are diagnosed with Covid19    If you are well enough to be discharged home and are not in a high risk group to be admitted, you should care for yourself at home exactly like you would if you have Influenza “flu”. Follow all the standard guidelines about washing your hands, covering your cough, etc. If you feel unwell, stay home, rest and drink plenty of clear fluids. Keep track of your symptoms. You should return to the Emergency Department if you develop worse symptoms, trouble breathing, chest pain, and/or a fever that doesn’t improve with over the counter medications.    Please consider going through the drive-through testing unless you are severely ill and need to go to the ED.  -through testing is available at various location, including [place].  Call [facility] at  [phone] to make an appointment.    How to Set Up Your Home for Self-Quarantine or Self-Isolation    Please refer to this helpful video.   [email or web address].[email or web address]

## 2024-04-14 NOTE — ED PROVIDER NOTE - CARE PROVIDER_API CALL
YAZAN BEAR  [address] [place] [address]  Phone: [phone]  Fax: ([phone]  Established Patient  Follow Up Time: 1-3 Days

## 2024-04-14 NOTE — ED PROVIDER NOTE - PHYSICAL EXAMINATION
VITAL SIGNS: I have reviewed nursing notes and confirm.  CONSTITUTIONAL: Well-developed; well-nourished; in no acute distress.  EYES: PERRL,  conjunctiva and sclera clear.  ENT: MMM, OP clear. No erythema/exudates/tonsillar hypertrophy. Uvula is midline. No pooling of secretions. Normal voice.   CARD: S1, S2 normal; no murmurs, gallops, or rubs. Regular rate and rhythm.  RESP: Normal respiratory effort, no tachypnea or distress. Lungs With slight crackles in the right base but good air entry, otherwise clear without wheezing.  Patient with coughing fits on deep inspiration  ABD: soft, NT/ND.  EXT: Normal ROM. No clubbing, cyanosis or edema.  NEURO: Alert, oriented. Grossly unremarkable. No focal deficits.  PSYCH: Cooperative, appropriate.

## 2024-04-14 NOTE — ED PROVIDER NOTE - PROGRESS NOTE DETAILS
YAHAIRA: Given albuterol for reactive cough.  Feels better and will give pump and discharge home with outpatient PMD follow-up.  Supportive care return precaution advised.  CXR without pneumonia.  Patient comfortable plan    Patient to be discharged from ED. Any available test results were discussed with patient and/or family. Verbal instructions given, including instructions to return to ED immediately for any new, worsening, or concerning symptoms. Patient endorsed understanding. Written discharge instructions additionally given, including follow-up plan.

## 2024-04-14 NOTE — ED PROVIDER NOTE - PATIENT PORTAL LINK FT
You can access the FollowMyHealth Patient Portal offered by [facility] by registering at the following website: [email or web address]. By joining [facility]’s FollowMyHealth portal, you will also be able to view your health information using other applications (apps) compatible with our system.

## 2024-04-14 NOTE — ED PROVIDER NOTE - CARE PLAN
Principal Discharge DX:	Cough   1 Principal Discharge DX:	2019 novel coronavirus disease (COVID-19)

## 2024-04-14 NOTE — ED PROVIDER NOTE - ATTENDING CONTRIBUTION TO CARE
63-year-old female to ED with cough congestion URI symptoms.  No fevers or sick contacts but states persistent cough no trauma or fall.  Afebrile vital signs stable exam as noted clear lungs bilaterally conjunctiva pink HEENT normal, regular rate and rhythm abdomen soft nontender and neuro nonfocal.

## 2024-07-23 ENCOUNTER — APPOINTMENT (OUTPATIENT)
Dept: OBGYN | Facility: CLINIC | Age: 64
End: 2024-07-23
Payer: COMMERCIAL

## 2024-07-23 DIAGNOSIS — N76.2 ACUTE VULVITIS: ICD-10-CM

## 2024-07-23 DIAGNOSIS — N76.0 ACUTE VAGINITIS: ICD-10-CM

## 2024-07-23 PROCEDURE — 99214 OFFICE O/P EST MOD 30 MIN: CPT

## 2024-07-23 RX ORDER — CLOTRIMAZOLE AND BETAMETHASONE DIPROPIONATE 10; .5 MG/G; MG/G
1-0.05 CREAM TOPICAL
Qty: 1 | Refills: 2 | Status: ACTIVE | COMMUNITY
Start: 2024-07-23 | End: 1900-01-01

## 2024-07-23 NOTE — HISTORY OF PRESENT ILLNESS
[FreeTextEntry1] : --64 Y/O HERE C/O VULVA ITCHING AND IRRITATION.  .HX OF LGSIL  HX OF FIBROID UTERUS. PMHX;/ PSHX;/ SOCIAL HX;-ETOH -CIGG  STD;   HPV /        DISCUSSED [facility] FAMILY HISTORY OF BREAST CANCER;NO REVIEW OF SYMPTOMS DONE; ALLERGIES; pt answered NKDA Medication reconciliation was completed by reviewing, with the patient's involvement, the patient's current outpatient medications and those  ordered for the patient today.   PE; ABDOMEN;SOFT NT ND NL GENITALIA ERYTHEMA VAGINA DC MIN WHITE CX-CMT UTERUS 6 WEEKS SIZE NT ADNEXA NT - MASSES EXT -CCE  A;P; VAGINITIS/VULVITIS -BD AFFIRM -LOTRISONE CREAM BID X 10 DAYS -F-U PRN.

## 2024-07-24 LAB
BV BACTERIA RRNA VAG QL NAA+PROBE: NOT DETECTED
C GLABRATA RNA VAG QL NAA+PROBE: NOT DETECTED
C TRACH RRNA SPEC QL NAA+PROBE: NOT DETECTED
CANDIDA RRNA VAG QL PROBE: NOT DETECTED
N GONORRHOEA RRNA SPEC QL NAA+PROBE: NOT DETECTED
T VAGINALIS RRNA SPEC QL NAA+PROBE: NOT DETECTED

## 2024-09-09 ENCOUNTER — APPOINTMENT (OUTPATIENT)
Dept: OBGYN | Facility: CLINIC | Age: 64
End: 2024-09-09

## 2024-09-24 NOTE — DISCHARGE NOTE PROVIDER - DISCHARGE DATE
02-Dec-2021 Ok, if he has neurology team, then he should contact them for his refills and make sure he has a follows up with them, he has Epilepsy, meningoma, congential stenosis, and chronic headaches.  And I also think he should get his Topiramate from them (I sent it anyway as he maybe out).

## 2024-11-19 NOTE — ED PROVIDER NOTE - WORK/EXCUSE FORM DATE
Recent Visits  Date Type Provider Dept   10/31/24 Office Visit Casi Dao, APRN - CNP [facility]   07/31/24 Office Visit Casi Dao, APRN - CNP [facility]   06/18/24 Office Visit Casi Dao, APRN - CNP [facility]   05/14/24 Office Visit Casi Dao, APRN - CNP [facility]   04/24/24 Office Visit Casi Dao, APRN - CNP [facility]   02/28/24 Office Visit Casi Dao, APRN - CNP [facility]   01/24/24 Office Visit Casi Dao, APRN - CNP [facility]   10/18/23 Office Visit Casi Dao, APRN - CNP [facility]   10/09/23 Office Visit Linda Harrison DO [facility]   07/18/23 Office Visit Casi Dao, APRN - CNP [facility]   Showing recent visits within past 540 days with a meds authorizing provider and meeting all other requirements  Future Appointments  Date Type Provider Dept   03/04/25 Appointment Casi Dao APRN - CNP [facility]   Showing future appointments within next 150 days with a meds authorizing provider and meeting all other requirements     10/31/2024   
02-Sep-2023

## 2024-11-22 NOTE — ED ADULT NURSE NOTE - NS ED NURSE LEVEL OF CONSCIOUSNESS MENTAL STATUS
[facility]  Home Care Following Anesthesia  For 24 hours after surgery:  Get plenty of rest.  A responsible adult must stay with you for at least 24 hours after you leave the surgery center.  Do not drive or use heavy equipment.  If you have weakness or tingling, don't drive or use heavy equipment until this feeling goes away.   Do not drink alcohol.   Avoid strenuous or risky activities.  Ask for help when climbing stairs.  You may feel lightheaded.  IF so, sit for a few minutes before standing.  Have someone help you get up.   If you have nausea (feel sick to your stomach): Drink only clear liquids such as apple juice, ginger ale, broth or 7-Up.  Rest may also help.  Be sure to drink enough fluids.  Move to a regular diet as you feel able.   You may have a slight fever.  Call the doctor if your fever is over 100 F (37.7 C) (taken under the tongue) or lasts longer than 24 hours.  You may have a dry mouth, a sore throat, muscle aches or trouble sleeping. These should go away after 24 hours.  Do not make important or legal decisions.   It is recommended to avoid smoking.               Tips for taking pain medications  To get the best pain relief possible, remember these points:  Take pain medications as directed, before pain becomes severe.  Pain medication can upset your stomach: taking it with food may help.  Constipation is a common side effect of pain medication. Drink plenty of  fluids.  Eat foods high in fiber. Take a stool softener if recommended by your doctor or pharmacist.  Do not drink alcohol, drive or operate machinery while taking pain medications.  Ask about other ways to control pain, such as with heat, ice or relaxation.    Tylenol/Acetaminophen Consumption    If you feel your pain relief is insufficient, you may take Tylenol/Acetaminophen in addition to your narcotic pain medication.   Be careful not to exceed 4,000 mg of Tylenol/Acetaminophen in a 24 hour  period from all sources.  If you are taking extra strength Tylenol/acetaminophen (500 mg), the maximum dose is 8 tablets in 24 hours.  If you are taking regular strength acetaminophen (325 mg), the maximum dose is 12 tablets in 24 hours.    Call a doctor for any of the following:  Signs of infection (fever, growing tenderness at the surgery site, a large amount of drainage or bleeding, severe pain, foul-smelling drainage, redness, swelling).  It has been over 8 to 10 hours since surgery and you are still not able to urinate (pass water).  Headache for over 24 hours.  Numbness, tingling or weakness the day after surgery (if you had spinal anesthesia).  Signs of Covid-19 infection (temperature over 100 degrees, shortness of breath, cough, loss of taste/smell, generalized body aches, persistent headache, chills, sore throat, nausea/vomiting/diarrhea)  Your doctor is:  Dr. Mario Calderon, General Surgery: [phone]                    Or dial [phone] and ask for the resident on call for:  General Surgery  For emergency care, call the:  [facility]:  [phone] (TTY for hearing impaired: [phone])                 Awake/Alert/Cooperative

## 2024-12-23 NOTE — ED ADULT NURSE NOTE - BREATH SOUNDS, MLM
Clear Complex Repair And Rhombic Flap Text: The defect edges were debeveled with a #15 scalpel blade.  The primary defect was closed partially with a complex linear closure.  Given the location of the remaining defect, shape of the defect and the proximity to free margins a rhombic flap was deemed most appropriate for complete closure of the defect.  Using a sterile surgical marker, an appropriate advancement flap was drawn incorporating the defect and placing the expected incisions within the relaxed skin tension lines where possible. The area thus outlined was incised deep to adipose tissue with a #15 scalpel blade. The skin margins were undermined to an appropriate distance in all directions utilizing iris scissors and carried over to close the primary defect.

## 2024-12-27 ENCOUNTER — APPOINTMENT (OUTPATIENT)
Dept: PLASTIC SURGERY | Facility: CLINIC | Age: 64
End: 2024-12-27
Payer: SELF-PAY

## 2024-12-27 PROCEDURE — 99203 OFFICE O/P NEW LOW 30 MIN: CPT

## 2025-01-02 NOTE — ED ADULT TRIAGE NOTE - WEIGHT IN KG
Labs reviewed. Thyroid, Vitamin D, and Vitamin B12 are all normal. Continue Vitamin D supplementation. It is ok for her to stay off Vitamin B12. Continue Thyroid medication. 83.9

## 2025-02-12 ENCOUNTER — EMERGENCY (EMERGENCY)
Facility: HOSPITAL | Age: 65
LOS: 0 days | Discharge: HOME | End: 2025-02-13
Attending: EMERGENCY MEDICINE
Payer: COMMERCIAL

## 2025-02-12 VITALS
RESPIRATION RATE: 18 BRPM | DIASTOLIC BLOOD PRESSURE: 85 MMHG | OXYGEN SATURATION: 96 % | TEMPERATURE: 98 F | HEART RATE: 95 BPM | WEIGHT: 175.05 LBS | HEIGHT: 68 IN | SYSTOLIC BLOOD PRESSURE: 143 MMHG

## 2025-02-12 DIAGNOSIS — R63.0 ANOREXIA: ICD-10-CM

## 2025-02-12 DIAGNOSIS — R09.89 OTHER SPECIFIED SYMPTOMS AND SIGNS INVOLVING THE CIRCULATORY AND RESPIRATORY SYSTEMS: ICD-10-CM

## 2025-02-12 DIAGNOSIS — E87.6 HYPOKALEMIA: ICD-10-CM

## 2025-02-12 DIAGNOSIS — R30.0 DYSURIA: ICD-10-CM

## 2025-02-12 DIAGNOSIS — R07.89 OTHER CHEST PAIN: ICD-10-CM

## 2025-02-12 DIAGNOSIS — R05.1 ACUTE COUGH: ICD-10-CM

## 2025-02-12 DIAGNOSIS — M79.10 MYALGIA, UNSPECIFIED SITE: ICD-10-CM

## 2025-02-12 DIAGNOSIS — R06.02 SHORTNESS OF BREATH: ICD-10-CM

## 2025-02-12 DIAGNOSIS — R51.9 HEADACHE, UNSPECIFIED: ICD-10-CM

## 2025-02-12 LAB
BASOPHILS # BLD AUTO: 0 K/UL — SIGNIFICANT CHANGE UP (ref 0–0.2)
BASOPHILS NFR BLD AUTO: 0 % — SIGNIFICANT CHANGE UP (ref 0–1)
EOSINOPHIL # BLD AUTO: 0.05 K/UL — SIGNIFICANT CHANGE UP (ref 0–0.7)
EOSINOPHIL NFR BLD AUTO: 1.6 % — SIGNIFICANT CHANGE UP (ref 0–8)
HCT VFR BLD CALC: 37.3 % — SIGNIFICANT CHANGE UP (ref 37–47)
HGB BLD-MCNC: 12 G/DL — SIGNIFICANT CHANGE UP (ref 12–16)
IMM GRANULOCYTES NFR BLD AUTO: 0.3 % — SIGNIFICANT CHANGE UP (ref 0.1–0.3)
LYMPHOCYTES # BLD AUTO: 0.91 K/UL — LOW (ref 1.2–3.4)
LYMPHOCYTES # BLD AUTO: 29.4 % — SIGNIFICANT CHANGE UP (ref 20.5–51.1)
MCHC RBC-ENTMCNC: 27.6 PG — SIGNIFICANT CHANGE UP (ref 27–31)
MCHC RBC-ENTMCNC: 32.2 G/DL — SIGNIFICANT CHANGE UP (ref 32–37)
MCV RBC AUTO: 85.7 FL — SIGNIFICANT CHANGE UP (ref 81–99)
MONOCYTES # BLD AUTO: 0.35 K/UL — SIGNIFICANT CHANGE UP (ref 0.1–0.6)
MONOCYTES NFR BLD AUTO: 11.3 % — HIGH (ref 1.7–9.3)
NEUTROPHILS # BLD AUTO: 1.78 K/UL — SIGNIFICANT CHANGE UP (ref 1.4–6.5)
NEUTROPHILS NFR BLD AUTO: 57.4 % — SIGNIFICANT CHANGE UP (ref 42.2–75.2)
NRBC BLD AUTO-RTO: 0 /100 WBCS — SIGNIFICANT CHANGE UP (ref 0–0)
PLATELET # BLD AUTO: 166 K/UL — SIGNIFICANT CHANGE UP (ref 130–400)
PMV BLD: 11.9 FL — HIGH (ref 7.4–10.4)
RBC # BLD: 4.35 M/UL — SIGNIFICANT CHANGE UP (ref 4.2–5.4)
RBC # FLD: 14.6 % — HIGH (ref 11.5–14.5)
WBC # BLD: 3.1 K/UL — LOW (ref 4.8–10.8)
WBC # FLD AUTO: 3.1 K/UL — LOW (ref 4.8–10.8)

## 2025-02-12 PROCEDURE — 73562 X-RAY EXAM OF KNEE 3: CPT | Mod: RT

## 2025-02-12 PROCEDURE — 85014 HEMATOCRIT: CPT

## 2025-02-12 PROCEDURE — 36415 COLL VENOUS BLD VENIPUNCTURE: CPT

## 2025-02-12 PROCEDURE — 81001 URINALYSIS AUTO W/SCOPE: CPT

## 2025-02-12 PROCEDURE — 99285 EMERGENCY DEPT VISIT HI MDM: CPT

## 2025-02-12 PROCEDURE — 85025 COMPLETE CBC W/AUTO DIFF WBC: CPT

## 2025-02-12 PROCEDURE — 93005 ELECTROCARDIOGRAM TRACING: CPT

## 2025-02-12 PROCEDURE — 93010 ELECTROCARDIOGRAM REPORT: CPT

## 2025-02-12 PROCEDURE — 0241U: CPT

## 2025-02-12 PROCEDURE — 82803 BLOOD GASES ANY COMBINATION: CPT

## 2025-02-12 PROCEDURE — 85018 HEMOGLOBIN: CPT

## 2025-02-12 PROCEDURE — 71046 X-RAY EXAM CHEST 2 VIEWS: CPT | Mod: 26

## 2025-02-12 PROCEDURE — 84132 ASSAY OF SERUM POTASSIUM: CPT

## 2025-02-12 PROCEDURE — 82330 ASSAY OF CALCIUM: CPT

## 2025-02-12 PROCEDURE — 83605 ASSAY OF LACTIC ACID: CPT

## 2025-02-12 PROCEDURE — 84295 ASSAY OF SERUM SODIUM: CPT

## 2025-02-12 PROCEDURE — 71046 X-RAY EXAM CHEST 2 VIEWS: CPT

## 2025-02-12 PROCEDURE — 80053 COMPREHEN METABOLIC PANEL: CPT

## 2025-02-12 PROCEDURE — 99285 EMERGENCY DEPT VISIT HI MDM: CPT | Mod: 25

## 2025-02-12 PROCEDURE — 87086 URINE CULTURE/COLONY COUNT: CPT

## 2025-02-12 RX ORDER — IBUPROFEN 600 MG/1
600 TABLET, FILM COATED ORAL ONCE
Refills: 0 | Status: COMPLETED | OUTPATIENT
Start: 2025-02-12 | End: 2025-02-12

## 2025-02-12 RX ORDER — SODIUM CHLORIDE 9 G/ML
1000 INJECTION, SOLUTION INTRAVENOUS ONCE
Refills: 0 | Status: COMPLETED | OUTPATIENT
Start: 2025-02-12 | End: 2025-02-12

## 2025-02-12 RX ADMIN — SODIUM CHLORIDE 1000 MILLILITER(S): 9 INJECTION, SOLUTION INTRAVENOUS at 22:33

## 2025-02-12 RX ADMIN — IBUPROFEN 600 MILLIGRAM(S): 600 TABLET, FILM COATED ORAL at 22:33

## 2025-02-12 NOTE — ED PROVIDER NOTE - ATTENDING APP SHARED VISIT CONTRIBUTION OF CARE
64-year-old female presents for evaluation of cough and bodyaches for the past week.  Reports some diffuse myalgias, mild headache.  Tolerating p.o. though with decreased appetite.  Reports some shortness of breath and mild midsternal chest pain with coughing only.  Denies any exertional complaints.  No vomiting, diarrhea, abdominal pain.  States occasional dysuria though no hematuria or flank pain.  Denies recent travel.    VITAL SIGNS: noted  CONSTITUTIONAL: Well-developed; well-nourished; in no acute distress  HEAD: Normocephalic; atraumatic  EYES: PERRL, EOM intact; conjunctiva and sclera clear  ENT: No nasal discharge; airway clear. MMM  NECK: Supple; non tender. No anterior cervical lymphadenopathy noted  CARD: S1, S2 normal; no murmurs, gallops, or rubs. Regular rate and rhythm  RESP: CTAB/L, no wheezes, rales or rhonchi  ABD: Normal bowel sounds; soft; non-distended; non-tender; no CVA tenderness  EXT: Normal ROM. No calf tenderness or edema. Distal pulses intact  NEURO: Alert, oriented. Grossly unremarkable. No focal deficits  SKIN: Skin exam is warm and dry, no acute rash  MS: No midline spinal tenderness

## 2025-02-12 NOTE — ED ADULT TRIAGE NOTE - CHIEF COMPLAINT QUOTE
pt complaining of productive cough and body aches for about a week. sent by urgent care because at the urgent care the pulse ox showed 90-92%

## 2025-02-12 NOTE — ED PROVIDER NOTE - OBJECTIVE STATEMENT
64-year-old female with no significant past medical history presents to the ED for evaluation of cough x 1 week associated with runny nose, headache, body aches, and shortness of breath.  Patient reports she was seen at urgent care earlier today and advised to visit the ED because her oxygen was in the low 90s.  Patient denies fever, chills, recent trauma, recent sick contacts, recent surgeries, recent hospitalizations, history of cancer, use of hormones, history of blood clots, family history of CAD, cigarette smoking, or illicit drug use.    patient o2 sat at rest and with walking is 96-97% on RA.

## 2025-02-12 NOTE — ED PROVIDER NOTE - PATIENT PORTAL LINK FT
You can access the FollowMyHealth Patient Portal offered by [facility] by registering at the following website: [email or web address]. By joining [facility]’s FollowMyHealth portal, you will also be able to view your health information using other applications (apps) compatible with our system.

## 2025-02-12 NOTE — ED PROVIDER NOTE - WR INTERPRETED BY 2
[facility] Medicine Daily Progress Note    Date of Service  1/23/2019    Chief Complaint  88 y.o. male admitted 1/21/2019 with GI bleed on coumadin  For AFib received vit  K and Kcentra    Hospital Course          Interval Problem Update    Extubated yesterday  Paced to ST  -140  3L NC  Tarry BM overnight  Diet advanced to full liquid            Consultants/Specialty  Critical care  GI    Code Status  Full code    Disposition  Telemetry    Review of Systems  Review of Systems   Constitutional: Negative for fever and malaise/fatigue.   HENT: Negative for congestion, ear discharge and nosebleeds.    Eyes: Negative for blurred vision, pain, discharge and redness.   Respiratory: Negative for cough, hemoptysis, sputum production, shortness of breath and stridor.    Cardiovascular: Positive for leg swelling. Negative for chest pain, palpitations and orthopnea.   Gastrointestinal: Positive for melena. Negative for abdominal pain, blood in stool, diarrhea, nausea and vomiting.   Genitourinary: Negative for dysuria, flank pain and hematuria.   Musculoskeletal: Negative for back pain, falls, joint pain and neck pain.   Skin: Negative.    Neurological: Negative for speech change, focal weakness, seizures, loss of consciousness, weakness and headaches.   Psychiatric/Behavioral: Negative for hallucinations, memory loss and substance abuse. The patient is not nervous/anxious.    All other systems reviewed and are negative.       Physical Exam  Temp:  [36.5 °C (97.7 °F)-36.9 °C (98.4 °F)] 36.6 °C (97.9 °F)  Pulse:  [] 70  Resp:  [2-29] 17  SpO2:  [93 %-100 %] 94 %    Physical Exam   Constitutional: He is oriented to person, place, and time. He appears well-developed and well-nourished.   HENT:   Head: Normocephalic and atraumatic.   Mouth/Throat: Oropharynx is clear and moist.   Eyes: Conjunctivae are normal. Right eye exhibits no discharge. Left eye exhibits no discharge.   Neck: Neck supple. No JVD present. No  tracheal deviation present.   Cardiovascular: Regular rhythm and normal heart sounds.    Tachycardia   Pulmonary/Chest: Effort normal. No respiratory distress. He has rales. He exhibits no tenderness.   Abdominal: Soft. Bowel sounds are normal. He exhibits no distension. There is no tenderness. There is no rebound.   Musculoskeletal: He exhibits edema. He exhibits no tenderness.   Neurological: He is alert and oriented to person, place, and time. No cranial nerve deficit. He exhibits normal muscle tone.   Skin: Skin is warm and dry. No rash noted. He is not diaphoretic. No cyanosis or erythema. Nails show no clubbing.   Psychiatric: He has a normal mood and affect. His behavior is normal.   Nursing note and vitals reviewed.      Fluids    Intake/Output Summary (Last 24 hours) at 01/23/19 0932  Last data filed at 01/23/19 0600   Gross per 24 hour   Intake          1842.75 ml   Output             2465 ml   Net          -622.25 ml       Laboratory  Recent Labs      01/21/19   1924  01/21/19   2156   01/22/19   0543  01/22/19   0850  01/22/19   1450  01/23/19   0530   WBC  32.1*   --    --   27.6*   --    --   18.5*   RBC  3.35*   --    --   3.55*   --    --   2.74*   HEMOGLOBIN  9.8*  11.0*   < >  10.4*  9.6*  9.6*  8.0*   HEMATOCRIT  29.9*  32.7*   --   30.9*   --    --   24.2*   MCV  89.3   --    --   87.0   --    --   88.3   MCH  29.3   --    --   29.3   --    --   29.2   MCHC  32.8*   --    --   33.7   --    --   33.1*   RDW  48.3   --    --   47.2   --    --   49.5   PLATELETCT  219   --    --   180   --    --   127*   MPV  11.3   --    --   10.9   --    --   10.7    < > = values in this interval not displayed.     Recent Labs      01/21/19   1621  01/22/19   0543  01/23/19   0530   SODIUM  137  138  142   POTASSIUM  4.4  4.2  3.6   CHLORIDE  111  113*  111   CO2  21  16*  24   GLUCOSE  126*  169*  127*   BUN  89*  67*  36*   CREATININE  1.28  1.01  1.01   CALCIUM  7.2*  8.4*  7.8*     Recent Labs      01/21/19    1345  01/21/19   1635   APTT  49.0*   --    INR  6.56*  1.08               Imaging  DX-CHEST-PORTABLE (1 VIEW)   Final Result      1.  Supportive tubing as described above.   2.  Apparent slight interval increase in size of LEFT pleural fluid collection.   3.  No other significant change from prior exam.         EC-ECHOCARDIOGRAM COMPLETE W/O CONT   Final Result      DX-CHEST-PORTABLE (1 VIEW)   Final Result      Mild worsening hypoinflation and bibasilar infiltrate or atelectasis, worse on the LEFT.      AD-IFIJTYT-4 VIEW   Final Result      NG tube tip in the expected location of the mid stomach.      DX-CHEST-PORTABLE (1 VIEW)   Final Result      Tubes and line as described above.      Enlarged cardiac silhouette with left basal consolidation and left pleural fluid.           Assessment/Plan  Acute on chronic respiratory failure with hypoxia (HCC)- (present on admission)   Assessment & Plan    Tolerated extubation on 1/22/2019  Encourage IS use  RT protocol  Discussed with Dr. Da Silva         GI bleed- (present on admission)   Assessment & Plan    EGD with gastric ulcer and possible mass status post clipping  Continue IV Protonix for 24 more hours discussed with GI  Reviewed with patient and wife need to have follow-up endoscopy in 3 months     Aortic stenosis   Assessment & Plan    Mild noted on echo     Candidal intertrigo- (present on admission)   Assessment & Plan    Skin care  Nystatin     Anemia due to GI blood loss- (present on admission)   Assessment & Plan    Monitor hemoglobin and transfuse if less than 7    Continue Protonix     SIRS (systemic inflammatory response syndrome) (HCC)- (present on admission)   Assessment & Plan    Was empirically started on ceftriaxone and doxycycline for possible community-acquired pneumonia    No clinical signs of pneumonia  Pro-calcitonin normal  Antibiotics discontinued     Paroxysmal atrial fibrillation (HCC)- (present on admission)   Assessment & Plan    History of    QTC improved on repeat EKG after magnesium replacement  Resume sotalol with telemetry monitoring     Anticoagulant long-term use- (present on admission)   Assessment & Plan    On coumadin for history of A. Fib  Anticoagulation reversed given GI bleed    Continue to monitor off anticoagulation     History of pulmonary embolism- (present on admission)   Assessment & Plan    On coumadin outpatient  History of IVC filter    Anticoagulation reversed given severe GI bleed     COPD (chronic obstructive pulmonary disease) ([facility])- (present on admission)   Assessment & Plan    No acute exacerbation  Continue Symbicort and Spiriva and bronchodilators as needed     Cardiac pacemaker- (present on admission)   Assessment & Plan    Secondary to sick sinus syndrome     Hypopituitarism ([facility])- (present on admission)   Assessment & Plan    Resume home dose of hydrocortisone  Continue levothyroxine          VTE prophylaxis: SCD       Deepthi Boo

## 2025-02-12 NOTE — ED PROVIDER NOTE - CLINICAL SUMMARY MEDICAL DECISION MAKING FREE TEXT BOX
Patient is evaluated for cough and URI symptoms, labs and EKG reviewed, potassium on VBG 3.3, lactate normal.  UA with 6 white cells however unlikely clean-catch given epithelials.  Culture sent.  X-rays reviewed, no acute injury.  Patient with normal oxygenation in ED at rest and with ambulation. Suspicion for clinical pneumonia, patient prescribed antibiotics orally and advised to follow-up closely with PMD for reassessment and agreed.  Strict return precautions advised and patient verbalized understanding.

## 2025-02-12 NOTE — ED PROVIDER NOTE - NSFOLLOWUPINSTRUCTIONS_ED_ALL_ED_FT
Our Emergency Department Referral Coordinators will be reaching out to you in the next 24-48 hours from 9:00am to 5:00pm to schedule a follow up appointment. Please expect a phone call from the hospital in that time frame. If you do not receive a call or if you have any questions or concerns, you can reach them at   [phone].     YOUR POTASSIUM WAS SLIGHTLY LOW IN THE ED WHICH IS WHY YOU WERE GIVEN POTASSIUM DURING YOUR ED VISIT.     Cough    Coughing is a reflex that clears your throat and your airways. Coughing helps to heal and protect your lungs. It is normal to cough occasionally, but a cough that happens with other symptoms or lasts a long time may be a sign of a condition that needs treatment. Coughing may be caused by infections, asthma or COPD, smoking, postnasal drip, gastroesophageal reflux, as well as other medical conditions. Take medicines only as instructed by your health care provider. Avoid environments or triggers that causes you to cough at work or at home.    SEEK IMMEDIATE MEDICAL CARE IF YOU HAVE ANY OF THE FOLLOWING SYMPTOMS: coughing up blood, shortness of breath, rapid heart rate, chest pain, unexplained weight loss or night sweats.    Hypokalemia  Hypokalemia means that the amount of potassium in the blood is lower than normal. Potassium is a chemical that helps regulate the amount of fluid in the body (electrolyte). It also stimulates muscle tightening (contraction) and helps nerves work properly. Normally, most of the body’s potassium is inside of cells, and only a very small amount is in the blood. Because the amount in the blood is so small, minor changes to potassium levels in the blood can be life-threatening.    What are the causes?  This condition may be caused by:    Antibiotic medicine.  Diarrhea or vomiting. Taking too much of a medicine that helps you have a bowel movement (laxative) can cause diarrhea and lead to hypokalemia.  Chronic kidney disease (CKD).  Medicines that help the body get rid of excess fluid (diuretics).  Eating disorders, such as bulimia.  Low magnesium levels in the body.  Sweating a lot.    What are the signs or symptoms?  Symptoms of this condition include:    Weakness.  Constipation.  Fatigue.  Muscle cramps.  Mental confusion.  Skipped heartbeats or irregular heartbeat (palpitations).  Tingling or numbness.    How is this diagnosed?  This condition is diagnosed with a blood test.    How is this treated?  Hypokalemia can be treated by taking potassium supplements by mouth or adjusting the medicines that you take. Treatment may also include eating more foods that contain a lot of potassium. If your potassium level is very low, you may need to get potassium through an IV tube in one of your veins and be monitored in the hospital.    Follow these instructions at home:  Take over-the-counter and prescription medicines only as told by your health care provider. This includes vitamins and supplements.  Eat a healthy diet. A healthy diet includes fresh fruits and vegetables, whole grains, healthy fats, and lean proteins.  If instructed, eat more foods that contain a lot of potassium, such as:    Nuts, such as peanuts and pistachios.  Seeds, such as sunflower seeds and pumpkin seeds.  Peas, lentils, and lima beans.  Whole grain and bran cereals and breads.  Fresh fruits and vegetables, such as apricots, avocado, bananas, cantaloupe, kiwi, oranges, tomatoes, asparagus, and potatoes.  Orange juice.  Tomato juice.  Red meats.  Yogurt.    ImageKeep all follow-up visits as told by your health care provider. This is important.  Contact a health care provider if:  You have weakness that gets worse.  You feel your heart pounding or racing.  You vomit.  You have diarrhea.  You have diabetes (diabetes mellitus) and you have trouble keeping your blood sugar (glucose) in your target range.  Get help right away if:  You have chest pain.  You have shortness of breath.  You have vomiting or diarrhea that lasts for more than 2 days.  You faint.  This information is not intended to replace advice given to you by your health care provider. Make sure you discuss any questions you have with your health care provider.

## 2025-02-12 NOTE — ED PROVIDER NOTE - PROGRESS NOTE DETAILS
FF: accidentally when hospital staff was wheeling stretcher in the hallway they bumped into pt left knee. pt requesting knee xray which we reviewed. pt given ace wrap for the right knee. pt also reported dysuria so ua was sent, since specimen was contaminated will wait for urine culture to treat. I discussed lab and radiology results with pt. pt advised of strict return precautions discussed at bedside. agreeable to dc. f/u with pcp and pulm.

## 2025-02-12 NOTE — ED PROVIDER NOTE - PROVIDER TOKENS
PROVIDER:[TOKEN:[[id number]],FOLLOWUP:[7-10 Days]],FREE:[LAST:[YOUR PRIMARY CARE DOCTOR],FIRST:[carlos],PHONE:[(   )    -],FAX:[(   )    -],FOLLOWUP:[7-10 Days]]

## 2025-02-12 NOTE — ED ADULT NURSE NOTE - NSFALLRISKFACTORS_ED_ALL_ED
No indicators present Hatchet Flap Text: The defect edges were debeveled with a #15 scalpel blade.  Given the location of the defect, shape of the defect and the proximity to free margins a hatchet flap was deemed most appropriate.  Using a sterile surgical marker, an appropriate hatchet flap was drawn incorporating the defect and placing the expected incisions within the relaxed skin tension lines where possible.    The area thus outlined was incised deep to adipose tissue with a #15 scalpel blade.  The skin margins were undermined to an appropriate distance in all directions utilizing iris scissors.

## 2025-02-12 NOTE — ED PROVIDER NOTE - CARE PROVIDER_API CALL
Kimo Blanco E  Pulmonary Disease  [address] [place] [id number]  Phone: [phone]  Fax: [phone]  Follow Up Time: 7-10 Days    YOUR PRIMARY CARE DOCTORcarlos  Phone: (   )    -  Fax: (   )    -  Follow Up Time: 7-10 Days

## 2025-02-12 NOTE — ED ADULT NURSE NOTE - OBJECTIVE STATEMENT
pt c/o cough and generalized weakness for the last few days. denies chest pain. pt states she was sent in [facility] or low pulse ox (88-90%). upon assessment, pulse ox 95% on RA.

## 2025-02-12 NOTE — ED PROVIDER NOTE - PHYSICAL EXAMINATION
Physical Exam    Vital Signs: I have reviewed the initial vital signs.  Constitutional: well-nourished, appears stated age, no acute distress  Eyes: Conjunctiva pink, Sclera clear  Cardiovascular: regular rate, regular rhythm, well-perfused extremities, radial pulses equal and 2+ b/l.   Respiratory: unlabored respiratory effort, clear to auscultation bilaterally no wheezing, rales and rhonchi. pt is speaking full sentences. no accessory muscle use.   Gastrointestinal: soft, non-tender, nondistended abdomen, no pulsatile mass, no rebound, no guarding  Musculoskeletal: FROM of b/l upper and lower extremities, no lower extremity edema, no calf tenderness  Integumentary: warm, dry, no rash  Neurologic: awake, alert,  steady gait.   Psychiatric: appropriate mood, appropriate affect

## 2025-02-13 LAB
ALBUMIN SERPL ELPH-MCNC: 3.3 G/DL — LOW (ref 3.5–5.2)
ALP SERPL-CCNC: 31 U/L — SIGNIFICANT CHANGE UP (ref 30–115)
ALT FLD-CCNC: <5 U/L — SIGNIFICANT CHANGE UP (ref 0–41)
ANION GAP SERPL CALC-SCNC: 5 MMOL/L — LOW (ref 7–14)
APPEARANCE UR: CLEAR — SIGNIFICANT CHANGE UP
AST SERPL-CCNC: 75 U/L — HIGH (ref 0–41)
BACTERIA # UR AUTO: ABNORMAL /HPF
BILIRUB SERPL-MCNC: 0.2 MG/DL — SIGNIFICANT CHANGE UP (ref 0.2–1.2)
BILIRUB UR-MCNC: NEGATIVE — SIGNIFICANT CHANGE UP
BUN SERPL-MCNC: 14 MG/DL — SIGNIFICANT CHANGE UP (ref 10–20)
CALCIUM SERPL-MCNC: 8.2 MG/DL — LOW (ref 8.4–10.5)
CAST: 0 /LPF — SIGNIFICANT CHANGE UP (ref 0–4)
CHLORIDE SERPL-SCNC: 101 MMOL/L — SIGNIFICANT CHANGE UP (ref 98–110)
CO2 SERPL-SCNC: 24 MMOL/L — SIGNIFICANT CHANGE UP (ref 17–32)
COLOR SPEC: YELLOW — SIGNIFICANT CHANGE UP
CREAT SERPL-MCNC: 0.8 MG/DL — SIGNIFICANT CHANGE UP (ref 0.7–1.5)
DIFF PNL FLD: ABNORMAL
EGFR: 83 ML/MIN/1.73M2 — SIGNIFICANT CHANGE UP
FLUAV AG NPH QL: SIGNIFICANT CHANGE UP
FLUBV AG NPH QL: SIGNIFICANT CHANGE UP
GAS PNL BLDV: SIGNIFICANT CHANGE UP
GLUCOSE SERPL-MCNC: 96 MG/DL — SIGNIFICANT CHANGE UP (ref 70–99)
GLUCOSE UR QL: NEGATIVE MG/DL — SIGNIFICANT CHANGE UP
KETONES UR-MCNC: NEGATIVE MG/DL — SIGNIFICANT CHANGE UP
LEUKOCYTE ESTERASE UR-ACNC: ABNORMAL
NITRITE UR-MCNC: NEGATIVE — SIGNIFICANT CHANGE UP
PH UR: 6 — SIGNIFICANT CHANGE UP (ref 5–8)
POTASSIUM SERPL-MCNC: SIGNIFICANT CHANGE UP MMOL/L (ref 3.5–5)
POTASSIUM SERPL-SCNC: SIGNIFICANT CHANGE UP MMOL/L (ref 3.5–5)
PROT SERPL-MCNC: 8.7 G/DL — HIGH (ref 6–8)
PROT UR-MCNC: NEGATIVE MG/DL — SIGNIFICANT CHANGE UP
RBC CASTS # UR COMP ASSIST: 2 /HPF — SIGNIFICANT CHANGE UP (ref 0–4)
RSV RNA NPH QL NAA+NON-PROBE: SIGNIFICANT CHANGE UP
SARS-COV-2 RNA SPEC QL NAA+PROBE: SIGNIFICANT CHANGE UP
SODIUM SERPL-SCNC: 130 MMOL/L — LOW (ref 135–146)
SP GR SPEC: 1.02 — SIGNIFICANT CHANGE UP (ref 1–1.03)
SQUAMOUS # UR AUTO: 17 /HPF — HIGH (ref 0–5)
UROBILINOGEN FLD QL: 0.2 MG/DL — SIGNIFICANT CHANGE UP (ref 0.2–1)
WBC UR QL: 6 /HPF — HIGH (ref 0–5)

## 2025-02-13 PROCEDURE — 73562 X-RAY EXAM OF KNEE 3: CPT | Mod: 26,RT

## 2025-02-13 RX ORDER — POTASSIUM CHLORIDE 750 MG/1
40 TABLET, EXTENDED RELEASE ORAL ONCE
Refills: 0 | Status: COMPLETED | OUTPATIENT
Start: 2025-02-13 | End: 2025-02-13

## 2025-02-13 RX ORDER — AZITHROMYCIN DIHYDRATE 500 MG/1
1 TABLET, FILM COATED ORAL
Qty: 6 | Refills: 0
Start: 2025-02-13 | End: 2025-02-17

## 2025-02-13 RX ORDER — AMOXICILLIN AND CLAVULANATE POTASSIUM 200; 28.5 MG/5ML; MG/5ML
875 POWDER, FOR SUSPENSION ORAL
Qty: 10 | Refills: 0
Start: 2025-02-13 | End: 2025-02-17

## 2025-02-13 RX ADMIN — POTASSIUM CHLORIDE 40 MILLIEQUIVALENT(S): 750 TABLET, EXTENDED RELEASE ORAL at 02:20

## 2025-02-14 LAB
CULTURE RESULTS: SIGNIFICANT CHANGE UP
SPECIMEN SOURCE: SIGNIFICANT CHANGE UP

## 2025-02-18 NOTE — CHART NOTE - NSCHARTNOTEFT_GEN_A_CORE
emailed Pulmo 2/13- AC / appt scheduled on 3/5 @ 9:15am @ [address]. Kevin Mena 2/18 - DEON (Pulmo Referral)

## 2025-03-05 ENCOUNTER — APPOINTMENT (OUTPATIENT)
Dept: PULMONOLOGY | Facility: CLINIC | Age: 65
End: 2025-03-05
Payer: COMMERCIAL

## 2025-03-05 ENCOUNTER — NON-APPOINTMENT (OUTPATIENT)
Age: 65
End: 2025-03-05

## 2025-03-05 VITALS
BODY MASS INDEX: 26.91 KG/M2 | SYSTOLIC BLOOD PRESSURE: 132 MMHG | DIASTOLIC BLOOD PRESSURE: 76 MMHG | OXYGEN SATURATION: 99 % | WEIGHT: 177 LBS | HEART RATE: 74 BPM | RESPIRATION RATE: 14 BRPM

## 2025-03-05 DIAGNOSIS — R05.9 COUGH, UNSPECIFIED: ICD-10-CM

## 2025-03-05 DIAGNOSIS — R09.82 POSTNASAL DRIP: ICD-10-CM

## 2025-03-05 DIAGNOSIS — J45.998 OTHER ASTHMA: ICD-10-CM

## 2025-03-05 PROCEDURE — 99204 OFFICE O/P NEW MOD 45 MIN: CPT

## 2025-03-05 PROCEDURE — G2211 COMPLEX E/M VISIT ADD ON: CPT

## 2025-03-05 RX ORDER — AZELASTINE HYDROCHLORIDE 137 UG/1
137 SPRAY, METERED NASAL TWICE DAILY
Qty: 1 | Refills: 0 | Status: ACTIVE | COMMUNITY
Start: 2025-03-05 | End: 1900-01-01

## 2025-03-05 RX ORDER — BUDESONIDE AND FORMOTEROL FUMARATE DIHYDRATE 160; 4.5 UG/1; UG/1
160-4.5 AEROSOL RESPIRATORY (INHALATION) TWICE DAILY
Qty: 1 | Refills: 0 | Status: ACTIVE | COMMUNITY
Start: 2025-03-05 | End: 1900-01-01

## 2025-03-10 ENCOUNTER — APPOINTMENT (OUTPATIENT)
Dept: PLASTIC SURGERY | Facility: CLINIC | Age: 65
End: 2025-03-10
Payer: SELF-PAY

## 2025-03-10 PROCEDURE — 13151 CMPLX RPR E/N/E/L 1.1-2.5 CM: CPT

## 2025-03-17 ENCOUNTER — APPOINTMENT (OUTPATIENT)
Dept: PLASTIC SURGERY | Facility: CLINIC | Age: 65
End: 2025-03-17
Payer: SELF-PAY

## 2025-03-17 DIAGNOSIS — S01.319A LACERATION W/OUT FOREIGN BODY OF UNSPECIFIED EAR, INITIAL ENCOUNTER: ICD-10-CM

## 2025-03-17 PROCEDURE — 99024 POSTOP FOLLOW-UP VISIT: CPT

## 2025-03-19 ENCOUNTER — APPOINTMENT (OUTPATIENT)
Dept: PLASTIC SURGERY | Facility: CLINIC | Age: 65
End: 2025-03-19
Payer: SELF-PAY

## 2025-03-19 DIAGNOSIS — S01.312A LACERATION W/OUT FOREIGN BODY OF LEFT EAR, INITIAL ENCOUNTER: ICD-10-CM

## 2025-03-19 PROCEDURE — 99024 POSTOP FOLLOW-UP VISIT: CPT

## 2025-03-31 ENCOUNTER — LABORATORY RESULT (OUTPATIENT)
Age: 65
End: 2025-03-31

## 2025-03-31 ENCOUNTER — APPOINTMENT (OUTPATIENT)
Dept: OBGYN | Facility: CLINIC | Age: 65
End: 2025-03-31
Payer: COMMERCIAL

## 2025-03-31 DIAGNOSIS — Z01.419 ENCOUNTER FOR GYNECOLOGICAL EXAMINATION (GENERAL) (ROUTINE) W/OUT ABNORMAL FINDINGS: ICD-10-CM

## 2025-03-31 DIAGNOSIS — D21.9 BENIGN NEOPLASM OF CONNECTIVE AND OTHER SOFT TISSUE, UNSPECIFIED: ICD-10-CM

## 2025-03-31 DIAGNOSIS — R10.2 PELVIC AND PERINEAL PAIN: ICD-10-CM

## 2025-03-31 PROCEDURE — 99213 OFFICE O/P EST LOW 20 MIN: CPT | Mod: 25

## 2025-03-31 PROCEDURE — 99396 PREV VISIT EST AGE 40-64: CPT

## 2025-03-31 PROCEDURE — 96127 BRIEF EMOTIONAL/BEHAV ASSMT: CPT

## 2025-04-01 ENCOUNTER — NON-APPOINTMENT (OUTPATIENT)
Age: 65
End: 2025-04-01

## 2025-04-03 NOTE — ED ADULT TRIAGE NOTE - GLASGOW COMA SCALE: BEST MOTOR RESPONSE, MLM
(M6) obeys commands Wife says what you had gave her, it is Terrell LEONE. Ok to send in for patient?   Flonase is not working.

## 2025-04-06 LAB — HPV HIGH+LOW RISK DNA PNL CVX: DETECTED

## 2025-04-07 ENCOUNTER — APPOINTMENT (OUTPATIENT)
Dept: PULMONOLOGY | Facility: CLINIC | Age: 65
End: 2025-04-07
Payer: COMMERCIAL

## 2025-04-07 VITALS
OXYGEN SATURATION: 99 % | RESPIRATION RATE: 16 BRPM | BODY MASS INDEX: 26 KG/M2 | HEART RATE: 100 BPM | SYSTOLIC BLOOD PRESSURE: 130 MMHG | DIASTOLIC BLOOD PRESSURE: 68 MMHG | WEIGHT: 171 LBS

## 2025-04-07 DIAGNOSIS — R05.9 COUGH, UNSPECIFIED: ICD-10-CM

## 2025-04-07 DIAGNOSIS — J45.998 OTHER ASTHMA: ICD-10-CM

## 2025-04-07 DIAGNOSIS — R09.82 POSTNASAL DRIP: ICD-10-CM

## 2025-04-07 LAB — CYTOLOGY CVX/VAG DOC THIN PREP: ABNORMAL

## 2025-04-07 PROCEDURE — G2211 COMPLEX E/M VISIT ADD ON: CPT

## 2025-04-07 PROCEDURE — 99214 OFFICE O/P EST MOD 30 MIN: CPT

## 2025-04-07 RX ORDER — BUDESONIDE AND FORMOTEROL FUMARATE DIHYDRATE 80; 4.5 UG/1; UG/1
80-4.5 AEROSOL RESPIRATORY (INHALATION) TWICE DAILY
Qty: 3 | Refills: 0 | Status: ACTIVE | COMMUNITY
Start: 2025-04-07 | End: 1900-01-01

## 2025-04-21 ENCOUNTER — NON-APPOINTMENT (OUTPATIENT)
Age: 65
End: 2025-04-21

## 2025-06-16 ENCOUNTER — APPOINTMENT (OUTPATIENT)
Dept: PLASTIC SURGERY | Facility: CLINIC | Age: 65
End: 2025-06-16

## 2025-07-07 ENCOUNTER — RX RENEWAL (OUTPATIENT)
Age: 65
End: 2025-07-07

## 2025-07-09 ENCOUNTER — APPOINTMENT (OUTPATIENT)
Dept: PULMONOLOGY | Facility: CLINIC | Age: 65
End: 2025-07-09
Payer: COMMERCIAL

## 2025-07-09 VITALS — SYSTOLIC BLOOD PRESSURE: 122 MMHG | DIASTOLIC BLOOD PRESSURE: 68 MMHG | HEART RATE: 93 BPM | OXYGEN SATURATION: 98 %

## 2025-07-09 PROCEDURE — 99213 OFFICE O/P EST LOW 20 MIN: CPT

## 2025-07-28 ENCOUNTER — APPOINTMENT (OUTPATIENT)
Dept: PLASTIC SURGERY | Facility: CLINIC | Age: 65
End: 2025-07-28
Payer: SELF-PAY

## 2025-07-28 PROCEDURE — 69090 EAR PIERCING: CPT

## 2025-08-04 ENCOUNTER — NON-APPOINTMENT (OUTPATIENT)
Age: 65
End: 2025-08-04

## 2025-08-19 ENCOUNTER — EMERGENCY (EMERGENCY)
Facility: HOSPITAL | Age: 65
LOS: 0 days | Discharge: HOME | End: 2025-08-19
Attending: STUDENT IN AN ORGANIZED HEALTH CARE EDUCATION/TRAINING PROGRAM
Payer: COMMERCIAL

## 2025-08-19 VITALS
DIASTOLIC BLOOD PRESSURE: 92 MMHG | HEART RATE: 99 BPM | WEIGHT: 179.02 LBS | OXYGEN SATURATION: 98 % | RESPIRATION RATE: 20 BRPM | TEMPERATURE: 98 F | SYSTOLIC BLOOD PRESSURE: 143 MMHG | HEIGHT: 68 IN

## 2025-08-19 DIAGNOSIS — M79.604 PAIN IN RIGHT LEG: ICD-10-CM

## 2025-08-19 PROCEDURE — 99283 EMERGENCY DEPT VISIT LOW MDM: CPT | Mod: 25

## 2025-08-19 PROCEDURE — 99283 EMERGENCY DEPT VISIT LOW MDM: CPT

## 2025-08-19 PROCEDURE — 96372 THER/PROPH/DIAG INJ SC/IM: CPT

## 2025-08-19 RX ORDER — METHOCARBAMOL 500 MG/1
1000 TABLET, FILM COATED ORAL ONCE
Refills: 0 | Status: COMPLETED | OUTPATIENT
Start: 2025-08-19 | End: 2025-08-19

## 2025-08-19 RX ORDER — METHOCARBAMOL 500 MG/1
2 TABLET, FILM COATED ORAL
Qty: 18 | Refills: 0
Start: 2025-08-19 | End: 2025-08-21

## 2025-08-19 RX ORDER — DEXAMETHASONE 0.5 MG/1
10 TABLET ORAL ONCE
Refills: 0 | Status: COMPLETED | OUTPATIENT
Start: 2025-08-19 | End: 2025-08-19

## 2025-08-19 RX ORDER — KETOROLAC TROMETHAMINE 30 MG/ML
30 INJECTION, SOLUTION INTRAMUSCULAR; INTRAVENOUS ONCE
Refills: 0 | Status: DISCONTINUED | OUTPATIENT
Start: 2025-08-19 | End: 2025-08-19

## 2025-08-19 RX ADMIN — METHOCARBAMOL 1000 MILLIGRAM(S): 500 TABLET, FILM COATED ORAL at 21:32

## 2025-08-19 RX ADMIN — DEXAMETHASONE 10 MILLIGRAM(S): 0.5 TABLET ORAL at 21:32

## 2025-08-19 RX ADMIN — KETOROLAC TROMETHAMINE 30 MILLIGRAM(S): 30 INJECTION, SOLUTION INTRAMUSCULAR; INTRAVENOUS at 21:32
